# Patient Record
Sex: MALE | Race: WHITE | NOT HISPANIC OR LATINO | Employment: FULL TIME | URBAN - METROPOLITAN AREA
[De-identification: names, ages, dates, MRNs, and addresses within clinical notes are randomized per-mention and may not be internally consistent; named-entity substitution may affect disease eponyms.]

---

## 2017-09-24 ENCOUNTER — APPOINTMENT (OUTPATIENT)
Dept: GENERAL RADIOLOGY | Facility: CLINIC | Age: 27
End: 2017-09-24
Attending: EMERGENCY MEDICINE
Payer: COMMERCIAL

## 2017-09-24 ENCOUNTER — HOSPITAL ENCOUNTER (EMERGENCY)
Facility: CLINIC | Age: 27
Discharge: HOME | End: 2017-09-24
Attending: EMERGENCY MEDICINE | Admitting: EMERGENCY MEDICINE
Payer: COMMERCIAL

## 2017-09-24 VITALS
OXYGEN SATURATION: 100 % | TEMPERATURE: 98.4 F | WEIGHT: 160 LBS | RESPIRATION RATE: 18 BRPM | DIASTOLIC BLOOD PRESSURE: 93 MMHG | SYSTOLIC BLOOD PRESSURE: 144 MMHG | BODY MASS INDEX: 25.82 KG/M2
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
## 2017-09-24 DIAGNOSIS — M79.642 PAIN OF LEFT HAND: ICD-10-CM
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
## 2017-09-24 DIAGNOSIS — S60.012A CONTUSION OF LEFT THUMB WITHOUT DAMAGE TO NAIL, INITIAL ENCOUNTER: ICD-10-CM

## 2017-09-24 PROCEDURE — 99283 EMERGENCY DEPT VISIT LOW MDM: CPT | Performed by: EMERGENCY MEDICINE

## 2017-09-24 PROCEDURE — 73130 X-RAY EXAM OF HAND: CPT | Mod: LT

## 2017-09-24 PROCEDURE — 99283 EMERGENCY DEPT VISIT LOW MDM: CPT | Mod: Z6 | Performed by: EMERGENCY MEDICINE

## 2017-09-24 ASSESSMENT — ENCOUNTER SYMPTOMS
ENDOCRINE NEGATIVE: 1
PSYCHIATRIC NEGATIVE: 1
HEMATOLOGIC/LYMPHATIC NEGATIVE: 1
RESPIRATORY NEGATIVE: 1
CONSTITUTIONAL NEGATIVE: 1
ALLERGIC/IMMUNOLOGIC NEGATIVE: 1
NEUROLOGICAL NEGATIVE: 1
EYES NEGATIVE: 1

## 2017-09-24 NOTE — ED AVS SNAPSHOT
[facility] Emergency Department    [address] [facility] [place] [address]    Phone:  [phone]    Fax:  [phone]                                       Rudi Alejo   MRN: [id number]    Department:  [facility] Emergency Department   Date of Visit:  9/24/2017           After Visit Summary Signature Page     I have received my discharge instructions, and my questions have been answered. I have discussed any challenges I see with this plan with the nurse or doctor.    ..........................................................................................................................................  Patient/Patient Representative Signature      ..........................................................................................................................................  Patient Representative Print Name and Relationship to Patient    ..................................................               ................................................  Date                                            Time    ..........................................................................................................................................  Reviewed by Signature/Title    ...................................................              ..............................................  Date                                                            Time

## 2017-09-24 NOTE — LETTER
To Whom it may concern:      Rudi Alejo was seen in our Emergency Department today, 09/24/17.  I expect his condition to improve over the next 2-3days.  he may return to work/school when improved.        Bao Moreau MD, FACEP
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
## 2017-09-25 ENCOUNTER — OFFICE VISIT (OUTPATIENT)
Dept: FAMILY MEDICINE | Facility: CLINIC | Age: 27
End: 2017-09-25
Payer: COMMERCIAL

## 2017-09-25 VITALS
HEART RATE: 72 BPM | HEIGHT: 66 IN | WEIGHT: 167 LBS | DIASTOLIC BLOOD PRESSURE: 82 MMHG | BODY MASS INDEX: 26.84 KG/M2 | SYSTOLIC BLOOD PRESSURE: 128 MMHG | TEMPERATURE: 98.3 F | RESPIRATION RATE: 16 BRPM

## 2017-09-25 DIAGNOSIS — F41.9 ANXIETY: ICD-10-CM

## 2017-09-25 PROCEDURE — 99213 OFFICE O/P EST LOW 20 MIN: CPT | Performed by: FAMILY MEDICINE

## 2017-09-25 RX ORDER — CITALOPRAM HYDROBROMIDE 20 MG/1
20 TABLET ORAL DAILY
Qty: 90 TABLET | Refills: 3 | Status: SHIPPED | OUTPATIENT
Start: 2017-09-25 | End: 2018-10-10

## 2017-09-25 NOTE — MR AVS SNAPSHOT
"              After Visit Summary   2017    Rudi Alejo    MRN: [id number]           Patient Information     Date Of Birth          1990        Visit Information        Provider Department      2017 12:00 PM Cedric Fields MD [facility]        Today's Diagnoses     Anxiety           Follow-ups after your visit        Who to contact     If you have questions or need follow up information about today's clinic visit or your schedule please contact [facility] directly at [phone].  Normal or non-critical lab and imaging results will be communicated to you by MyChart, letter or phone within 4 business days after the clinic has received the results. If you do not hear from us within 7 days, please contact the clinic through HomeStayhart or phone. If you have a critical or abnormal lab result, we will notify you by phone as soon as possible.  Submit refill requests through [employer] or call your pharmacy and they will forward the refill request to us. Please allow 3 business days for your refill to be completed.          Additional Information About Your Visit        MyChart Information     [employer] lets you send messages to your doctor, view your test results, renew your prescriptions, schedule appointments and more. To sign up, go to [email or web address] . Click on \"Log in\" on the left side of the screen, which will take you to the Welcome page. Then click on \"Sign up Now\" on the right side of the page.     You will be asked to enter the access code listed below, as well as some personal information. Please follow the directions to create your username and password.     Your access code is: [email or web address]  Expires: 2017  9:35 PM     Your access code will  in 90 days. If you need help or a new code, please call your [facility] or [phone].        Care EveryWhere ID     This is your Care EveryWhere ID. This could be used by other organizations to " "access your [place] medical records  [id number]        Your Vitals Were     Pulse Temperature Respirations Height BMI (Body Mass Index)       72 98.3  F (36.8  C) (Tympanic) 16 5' 6\" (1.676 m) 26.95 kg/m2        Blood Pressure from Last 3 Encounters:   09/25/17 128/82   09/24/17 (!) 144/93   09/28/16 104/78    Weight from Last 3 Encounters:   09/25/17 167 lb (75.8 kg)   09/24/17 160 lb (72.6 kg)   09/28/16 165 lb (74.8 kg)              Today, you had the following     No orders found for display         Where to get your medicines      These medications were sent to [facility] [place], [place] - [address]   [address] [facility] [address]     Phone:  [phone]     citalopram 20 MG tablet          Primary Care Provider Office Phone # Fax #    Cedric Fields [phone]       [address] [place] [phone]        Equal Access to Services     [facility] : Hadii estee ambrose hadasho Sorosario, waaxda luqadaha, qaybta kaalmada dot, theresa peña . So [facility] [phone].    ATENCIÓN: Si habla español, tiene a gimenez disposición servicios gratuitos de asistencia lingüística. [facility] [phone].    We comply with applicable federal civil rights laws and [place] laws. We do not discriminate on the basis of race, color, national origin, age, disability sex, sexual orientation or gender identity.            Thank you!     Thank you for choosing [facility]  for your care. Our goal is always to provide you with excellent care. Hearing back from our patients is one way we can continue to improve our services. Please take a few minutes to complete the written survey that you may receive in the mail after your visit with us. Thank you!             Your Updated Medication List - Protect others around you: Learn how to safely use, store and throw away your medicines at www.disposemymeds.org.          This list is accurate as of: " 9/25/17 12:46 PM.  Always use your most recent med list.                   Brand Name Dispense Instructions for use Diagnosis    citalopram 20 MG tablet    celeXA    90 tablet    Take 1 tablet (20 mg) by mouth daily    Anxiety

## 2017-09-25 NOTE — NURSING NOTE
"Chief Complaint   Patient presents with     Anxiety     refills       Initial /82  Pulse 72  Temp 98.3  F (36.8  C) (Tympanic)  Resp 16  Ht 5' 6\" (1.676 m)  Wt 167 lb (75.8 kg)  BMI 26.95 kg/m2 Estimated body mass index is 26.95 kg/(m^2) as calculated from the following:    Height as of this encounter: 5' 6\" (1.676 m).    Weight as of this encounter: 167 lb (75.8 kg).  Medication Reconciliation: complete    Health Maintenance that is potentially due pending provider review:  NONE    n/a    Is there anyone who you would like to be able to receive your results? No  If yes have patient fill out KEEGAN    "
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
## 2018-10-10 ENCOUNTER — OFFICE VISIT (OUTPATIENT)
Dept: FAMILY MEDICINE | Facility: CLINIC | Age: 28
End: 2018-10-10
Payer: COMMERCIAL

## 2018-10-10 VITALS
RESPIRATION RATE: 16 BRPM | WEIGHT: 164 LBS | DIASTOLIC BLOOD PRESSURE: 70 MMHG | HEIGHT: 66 IN | HEART RATE: 70 BPM | SYSTOLIC BLOOD PRESSURE: 114 MMHG | TEMPERATURE: 97.8 F | BODY MASS INDEX: 26.36 KG/M2

## 2018-10-10 DIAGNOSIS — F41.9 ANXIETY: ICD-10-CM

## 2018-10-10 PROCEDURE — 99213 OFFICE O/P EST LOW 20 MIN: CPT | Performed by: FAMILY MEDICINE

## 2018-10-10 RX ORDER — CITALOPRAM HYDROBROMIDE 20 MG/1
20 TABLET ORAL DAILY
Qty: 90 TABLET | Refills: 3 | Status: SHIPPED | OUTPATIENT
Start: 2018-10-10 | End: 2019-11-11

## 2018-10-10 ASSESSMENT — PATIENT HEALTH QUESTIONNAIRE - PHQ9
10. IF YOU CHECKED OFF ANY PROBLEMS, HOW DIFFICULT HAVE THESE PROBLEMS MADE IT FOR YOU TO DO YOUR WORK, TAKE CARE OF THINGS AT HOME, OR GET ALONG WITH OTHER PEOPLE: NOT DIFFICULT AT ALL
SUM OF ALL RESPONSES TO PHQ QUESTIONS 1-9: 2
SUM OF ALL RESPONSES TO PHQ QUESTIONS 1-9: 2

## 2018-10-10 ASSESSMENT — ANXIETY QUESTIONNAIRES
6. BECOMING EASILY ANNOYED OR IRRITABLE: NOT AT ALL
7. FEELING AFRAID AS IF SOMETHING AWFUL MIGHT HAPPEN: NOT AT ALL
3. WORRYING TOO MUCH ABOUT DIFFERENT THINGS: NOT AT ALL
7. FEELING AFRAID AS IF SOMETHING AWFUL MIGHT HAPPEN: NOT AT ALL
4. TROUBLE RELAXING: NOT AT ALL
5. BEING SO RESTLESS THAT IT IS HARD TO SIT STILL: NOT AT ALL
GAD7 TOTAL SCORE: 0
GAD7 TOTAL SCORE: 0
2. NOT BEING ABLE TO STOP OR CONTROL WORRYING: NOT AT ALL
GAD7 TOTAL SCORE: 0
1. FEELING NERVOUS, ANXIOUS, OR ON EDGE: NOT AT ALL

## 2018-10-10 ASSESSMENT — PAIN SCALES - GENERAL: PAINLEVEL: NO PAIN (0)

## 2018-10-10 NOTE — NURSING NOTE
"Chief Complaint   Patient presents with     Anxiety       Initial /70  Pulse 70  Temp 97.8  F (36.6  C) (Tympanic)  Resp 16  Ht 5' 6\" (1.676 m)  Wt 164 lb (74.4 kg)  BMI 26.47 kg/m2 Estimated body mass index is 26.47 kg/(m^2) as calculated from the following:    Height as of this encounter: 5' 6\" (1.676 m).    Weight as of this encounter: 164 lb (74.4 kg).    Patient presents to the clinic using     Health Maintenance that is potentially due pending provider review:          Is there anyone who you would like to be able to receive your results?   If yes have patient fill out KEEGAN    "

## 2018-10-10 NOTE — MR AVS SNAPSHOT
"              After Visit Summary   10/10/2018    Rudi Alejo    MRN: [id number]           Patient Information     Date Of Birth          1990        Visit Information        Provider Department      10/10/2018 11:00 AM Cedric Fields MD [facility]        Today's Diagnoses     Anxiety           Follow-ups after your visit        Who to contact     If you have questions or need follow up information about today's clinic visit or your schedule please contact [facility] directly at [phone].  Normal or non-critical lab and imaging results will be communicated to you by MyChart, letter or phone within 4 business days after the clinic has received the results. If you do not hear from us within 7 days, please contact the clinic through MyChart or phone. If you have a critical or abnormal lab result, we will notify you by phone as soon as possible.  Submit refill requests through Amimon or call your pharmacy and they will forward the refill request to us. Please allow 3 business days for your refill to be completed.          Additional Information About Your Visit        Care EveryWhere ID     This is your Care EveryWhere ID. This could be used by other organizations to access your [facility] medical records  [id number]        Your Vitals Were     Pulse Temperature Respirations Height BMI (Body Mass Index)       70 97.8  F (36.6  C) (Tympanic) 16 5' 6\" (1.676 m) 26.47 kg/m2        Blood Pressure from Last 3 Encounters:   10/10/18 114/70   09/25/17 128/82   09/24/17 (!) 144/93    Weight from Last 3 Encounters:   10/10/18 164 lb (74.4 kg)   09/25/17 167 lb (75.8 kg)   09/24/17 160 lb (72.6 kg)              Today, you had the following     No orders found for display         Where to get your medicines      These medications were sent to [facility] - [place], [place] - [address]  [address] [facility] [address]     Phone:  [phone]     " citalopram 20 MG tablet          Primary Care Provider Office Phone # Fax #    Cedric Fields [phone] [address] [place] [phone]        Equal Access to Services     MIK CR : Hadhemanth estee ku hadtayloro Soomaali, waaxda luqadaha, qaybta kaalmada adeegyada, waxjessee hoangn darin contreras laDeyvijudit garcia. So [facility] [phone].    ATENCIÓN: Si habla español, tiene a gimenez disposición servicios gratuitos de asistencia lingüística. Llame al [phone].    We comply with applicable federal civil rights laws and [place] laws. We do not discriminate on the basis of race, color, national origin, age, disability, sex, sexual orientation, or gender identity.            Thank you!     Thank you for choosing [facility]  for your care. Our goal is always to provide you with excellent care. Hearing back from our patients is one way we can continue to improve our services. Please take a few minutes to complete the written survey that you may receive in the mail after your visit with us. Thank you!             Your Updated Medication List - Protect others around you: Learn how to safely use, store and throw away your medicines at www.disposemymeds.org.          This list is accurate as of 10/10/18 11:45 AM.  Always use your most recent med list.                   Brand Name Dispense Instructions for use Diagnosis    citalopram 20 MG tablet    celeXA    90 tablet    Take 1 tablet (20 mg) by mouth daily    Anxiety

## 2018-10-10 NOTE — PROGRESS NOTES
"  SUBJECTIVE:   Rudi Alejo is a 27 year old male who presents to clinic today for the following health issues:      Anxiety Follow-Up    Status since last visit: No change    Other associated symptoms:None    Complicating factors:   Significant life event: No   Current substance abuse: None  Depression symptoms: No  MITRA-7 SCORE 11/6/2013 9/21/2015 10/10/2018   Total Score 0 - -   Total Score - - 0 (minimal anxiety)   Total Score - 0 0     Answers for HPI/ROS submitted by the patient on 10/10/2018   If you checked off any problems, how difficult have these problems made it for you to do your work, take care of things at home, or get along with other people?: Not difficult at all  PHQ9 TOTAL SCORE: 2  MITRA 7 TOTAL SCORE: 0    MITRA-7    Amount of exercise or physical activity: Job acitve    Problems taking medications regularly: No    Medication side effects: none          S :Rudi Alejo is a 27 year old male with anxiety.  Stable on long term celexa.  Thinking of a job change, so definitely should stay on through that.  He has wanted long term use anyway, so should be fine    No significant side effects, tolerates well    Problem list, med list, additional histories reviewed and updated, as indicated.      O:/70  Pulse 70  Temp 97.8  F (36.6  C) (Tympanic)  Resp 16  Ht 5' 6\" (1.676 m)  Wt 164 lb (74.4 kg)  BMI 26.47 kg/m2  GEN: Alert and oriented, in no acute distress  CV: RRR, no murmur  RESP: lungs clear bilaterally, good effort      A: anxiety, stable on celexa    P: fills for another year.   Doing well.       "

## 2018-10-11 ASSESSMENT — ANXIETY QUESTIONNAIRES: GAD7 TOTAL SCORE: 0

## 2018-10-11 ASSESSMENT — PATIENT HEALTH QUESTIONNAIRE - PHQ9: SUM OF ALL RESPONSES TO PHQ QUESTIONS 1-9: 2

## 2019-11-11 ENCOUNTER — OFFICE VISIT (OUTPATIENT)
Dept: FAMILY MEDICINE | Facility: CLINIC | Age: 29
End: 2019-11-11
Payer: COMMERCIAL

## 2019-11-11 VITALS
HEIGHT: 66 IN | RESPIRATION RATE: 14 BRPM | HEART RATE: 64 BPM | SYSTOLIC BLOOD PRESSURE: 130 MMHG | WEIGHT: 167.2 LBS | DIASTOLIC BLOOD PRESSURE: 80 MMHG | BODY MASS INDEX: 26.87 KG/M2 | TEMPERATURE: 97.8 F

## 2019-11-11 DIAGNOSIS — F41.9 ANXIETY: ICD-10-CM

## 2019-11-11 PROCEDURE — 99213 OFFICE O/P EST LOW 20 MIN: CPT | Performed by: FAMILY MEDICINE

## 2019-11-11 RX ORDER — CITALOPRAM HYDROBROMIDE 20 MG/1
20 TABLET ORAL DAILY
Qty: 90 TABLET | Refills: 3 | Status: SHIPPED | OUTPATIENT
Start: 2019-11-11 | End: 2020-04-28

## 2019-11-11 ASSESSMENT — MIFFLIN-ST. JEOR: SCORE: 1671.16

## 2019-11-11 ASSESSMENT — ANXIETY QUESTIONNAIRES
7. FEELING AFRAID AS IF SOMETHING AWFUL MIGHT HAPPEN: NOT AT ALL
GAD7 TOTAL SCORE: 3
2. NOT BEING ABLE TO STOP OR CONTROL WORRYING: SEVERAL DAYS
5. BEING SO RESTLESS THAT IT IS HARD TO SIT STILL: NOT AT ALL
6. BECOMING EASILY ANNOYED OR IRRITABLE: SEVERAL DAYS
3. WORRYING TOO MUCH ABOUT DIFFERENT THINGS: NOT AT ALL
IF YOU CHECKED OFF ANY PROBLEMS ON THIS QUESTIONNAIRE, HOW DIFFICULT HAVE THESE PROBLEMS MADE IT FOR YOU TO DO YOUR WORK, TAKE CARE OF THINGS AT HOME, OR GET ALONG WITH OTHER PEOPLE: NOT DIFFICULT AT ALL
1. FEELING NERVOUS, ANXIOUS, OR ON EDGE: SEVERAL DAYS

## 2019-11-11 ASSESSMENT — PATIENT HEALTH QUESTIONNAIRE - PHQ9: 5. POOR APPETITE OR OVEREATING: NOT AT ALL

## 2019-11-11 NOTE — PROGRESS NOTES
"Subjective     Rudi Alejo is a 29 year old male who presents to clinic today for the following health issues:    HPI   Anxiety Follow-Up    How are you doing with your anxiety since your last visit? No change    Are you having other symptoms that might be associated with anxiety? No    Have you had a significant life event? Relationship Concerns and Job Concerns     Are you feeling depressed? No    Do you have any concerns with your use of alcohol or other drugs? No    Social History     Tobacco Use     Smoking status: Never Smoker     Smokeless tobacco: Never Used   Substance Use Topics     Alcohol use: No     Drug use: No     MITRA-7 SCORE 9/21/2015 10/10/2018 11/11/2019   Total Score - - -   Total Score - 0 (minimal anxiety) -   Total Score 0 0 3     PHQ 9/21/2015 10/10/2018   PHQ-9 Total Score 2 2   Q9: Thoughts of better off dead/self-harm past 2 weeks Not at all Not at all         How many servings of fruits and vegetables do you eat daily?  0-1    On average, how many sweetened beverages do you drink each day (soda, juice, sweet tea, etc)?   2 tp 3  How many days per week do you miss taking your medication? 1 time a month    What makes it hard for you to take your medications?  remembering to take        S: Rudi Alejo is a 29 year old male with anxiety    Long term celexa.  Works well,  Wants fills    Problem list, med list, additional histories reviewed and updated, as indicated.      No big life changes    O:/80 (BP Location: Right arm, Patient Position: Sitting, Cuff Size: Adult Regular)   Pulse 64   Temp 97.8  F (36.6  C) (Tympanic)   Resp 14   Ht 1.676 m (5' 6\")   Wt 75.8 kg (167 lb 3.2 oz)   BMI 26.99 kg/m    GEN: Alert and oriented, in no acute distress  Well groomed, dressed appropriately. Good eye contact.  No abnormal motor movements, oriented x3, speaks clearly with a normal rate and volume.  Thoughts are coherent and linear.  No tangential responses or loose associatons.  " No obsessive behaviors discussed or observed, no suicidal thoughts.   Responds to questions appropriately, gives detailed answers.   Attention and concentration normal.  Affect WNL.  Insight and judgment appropriate.       A: anxiety, stable on celexa    P: fills.  Declined flu shot.  Self cares.  Back in a year

## 2019-11-11 NOTE — NURSING NOTE
"Chief Complaint   Patient presents with     Anxiety       Initial /80 (BP Location: Right arm, Patient Position: Sitting, Cuff Size: Adult Regular)   Pulse 64   Temp 97.8  F (36.6  C) (Tympanic)   Resp 14   Ht 1.676 m (5' 6\")   Wt 75.8 kg (167 lb 3.2 oz)   BMI 26.99 kg/m   Estimated body mass index is 26.99 kg/m  as calculated from the following:    Height as of this encounter: 1.676 m (5' 6\").    Weight as of this encounter: 75.8 kg (167 lb 3.2 oz).    Patient presents to the clinic using No DME    Health Maintenance that is potentially due pending provider review:  NONE    n/a    Is there anyone who you would like to be able to receive your results? No  If yes have patient fill out KEEGAN    Shanelle Reilly CMA    "

## 2019-11-12 ASSESSMENT — ANXIETY QUESTIONNAIRES: GAD7 TOTAL SCORE: 3

## 2020-04-28 DIAGNOSIS — F41.9 ANXIETY: ICD-10-CM

## 2020-04-28 RX ORDER — CITALOPRAM HYDROBROMIDE 20 MG/1
20 TABLET ORAL DAILY
Qty: 30 TABLET | Refills: 0 | Status: SHIPPED | OUTPATIENT
Start: 2020-04-28 | End: 2020-05-18

## 2020-08-15 DIAGNOSIS — F41.9 ANXIETY: ICD-10-CM

## 2020-08-17 RX ORDER — CITALOPRAM HYDROBROMIDE 20 MG/1
TABLET ORAL
Qty: 90 TABLET | Refills: 0 | Status: SHIPPED | OUTPATIENT
Start: 2020-08-17 | End: 2020-10-21

## 2020-10-21 ENCOUNTER — VIRTUAL VISIT (OUTPATIENT)
Dept: FAMILY MEDICINE | Facility: CLINIC | Age: 30
End: 2020-10-21
Payer: COMMERCIAL

## 2020-10-21 DIAGNOSIS — F41.9 ANXIETY: ICD-10-CM

## 2020-10-21 PROCEDURE — 99207 PR NO CHARGE LOS: CPT | Performed by: FAMILY MEDICINE

## 2020-10-21 RX ORDER — CITALOPRAM HYDROBROMIDE 20 MG/1
20 TABLET ORAL DAILY
Qty: 90 TABLET | Refills: 3 | Status: SHIPPED | OUTPATIENT
Start: 2020-10-21 | End: 2021-11-10

## 2021-11-07 DIAGNOSIS — F41.9 ANXIETY: ICD-10-CM

## 2021-11-10 RX ORDER — CITALOPRAM HYDROBROMIDE 20 MG/1
TABLET ORAL
Qty: 90 TABLET | Refills: 0 | Status: SHIPPED | OUTPATIENT
Start: 2021-11-10 | End: 2022-04-15

## 2022-02-04 DIAGNOSIS — F41.9 ANXIETY: ICD-10-CM

## 2022-02-04 RX ORDER — CITALOPRAM HYDROBROMIDE 20 MG/1
TABLET ORAL
Qty: 90 TABLET | Refills: 0 | OUTPATIENT
Start: 2022-02-04

## 2022-04-15 ENCOUNTER — OFFICE VISIT (OUTPATIENT)
Dept: FAMILY MEDICINE | Facility: CLINIC | Age: 32
End: 2022-04-15
Payer: COMMERCIAL

## 2022-04-15 VITALS
SYSTOLIC BLOOD PRESSURE: 127 MMHG | BODY MASS INDEX: 31.88 KG/M2 | DIASTOLIC BLOOD PRESSURE: 82 MMHG | WEIGHT: 198.4 LBS | RESPIRATION RATE: 18 BRPM | HEART RATE: 77 BPM | HEIGHT: 66 IN | TEMPERATURE: 97.4 F

## 2022-04-15 DIAGNOSIS — R73.09 ELEVATED GLUCOSE: ICD-10-CM

## 2022-04-15 DIAGNOSIS — Z00.00 WELL ADULT EXAM: Primary | ICD-10-CM

## 2022-04-15 DIAGNOSIS — F41.9 ANXIETY: ICD-10-CM

## 2022-04-15 LAB
CHOLEST SERPL-MCNC: 309 MG/DL
FASTING STATUS PATIENT QL REPORTED: YES
FASTING STATUS PATIENT QL REPORTED: YES
GLUCOSE BLD-MCNC: 90 MG/DL (ref 70–99)
HBA1C MFR BLD: 5.4 % (ref 0–5.6)
HDLC SERPL-MCNC: 30 MG/DL
LDLC SERPL CALC-MCNC: 132 MG/DL
LDLC SERPL CALC-MCNC: ABNORMAL MG/DL
NONHDLC SERPL-MCNC: 279 MG/DL
TRIGL SERPL-MCNC: 885 MG/DL

## 2022-04-15 PROCEDURE — 99214 OFFICE O/P EST MOD 30 MIN: CPT | Mod: 25 | Performed by: FAMILY MEDICINE

## 2022-04-15 PROCEDURE — 80061 LIPID PANEL: CPT | Performed by: FAMILY MEDICINE

## 2022-04-15 PROCEDURE — 36415 COLL VENOUS BLD VENIPUNCTURE: CPT | Performed by: FAMILY MEDICINE

## 2022-04-15 PROCEDURE — 99395 PREV VISIT EST AGE 18-39: CPT | Performed by: FAMILY MEDICINE

## 2022-04-15 PROCEDURE — 83721 ASSAY OF BLOOD LIPOPROTEIN: CPT | Mod: 59 | Performed by: FAMILY MEDICINE

## 2022-04-15 PROCEDURE — 83036 HEMOGLOBIN GLYCOSYLATED A1C: CPT | Performed by: FAMILY MEDICINE

## 2022-04-15 PROCEDURE — 82947 ASSAY GLUCOSE BLOOD QUANT: CPT | Performed by: FAMILY MEDICINE

## 2022-04-15 RX ORDER — CITALOPRAM HYDROBROMIDE 20 MG/1
20 TABLET ORAL DAILY
Qty: 90 TABLET | Refills: 3 | Status: SHIPPED | OUTPATIENT
Start: 2022-04-15 | End: 2023-03-28

## 2022-04-15 ASSESSMENT — PAIN SCALES - GENERAL: PAINLEVEL: NO PAIN (0)

## 2022-04-15 ASSESSMENT — ENCOUNTER SYMPTOMS
HEMATOCHEZIA: 0
PARESTHESIAS: 0
FREQUENCY: 0
ARTHRALGIAS: 0
SHORTNESS OF BREATH: 0
FEVER: 0
HEADACHES: 0
SORE THROAT: 0
JOINT SWELLING: 0
HEMATURIA: 0
EYE PAIN: 0
COUGH: 0
HEARTBURN: 0
ABDOMINAL PAIN: 0
NERVOUS/ANXIOUS: 0
DIZZINESS: 0
WEAKNESS: 0
CONSTIPATION: 0
MYALGIAS: 0
CHILLS: 0
DYSURIA: 0
PALPITATIONS: 0
DIARRHEA: 0
NAUSEA: 0

## 2022-04-15 NOTE — LETTER
April 20, 2022      Rudi Alejo  [address]  [place] [address]        Dear ,    We are writing to inform you of your test results.    Test results indicate you may require additional follow up, see comment below.    Resulted Orders   Lipid panel reflex to direct LDL Fasting   Result Value Ref Range    Cholesterol 309 (H) <200 mg/dL    Triglycerides 885 (H) <150 mg/dL    Direct Measure HDL 30 (L) >=40 mg/dL    LDL Cholesterol Calculated        Comment:      Cannot estimate LDL when triglyceride exceeds 400 mg/dL    Non HDL Cholesterol 279 (H) <130 mg/dL    Patient Fasting > 8hrs? Yes     Narrative    Cholesterol  Desirable:  <200 mg/dL    Triglycerides  Normal:  Less than 150 mg/dL  Borderline High:  150-199 mg/dL  High:  200-499 mg/dL  Very High:  Greater than or equal to 500 mg/dL    Direct Measure HDL  Female:  Greater than or equal to 50 mg/dL   Male:  Greater than or equal to 40 mg/dL    LDL Cholesterol  Desirable:  <100mg/dL  Above Desirable:  100-129 mg/dL   Borderline High:  130-159 mg/dL   High:  160-189 mg/dL   Very High:  >= 190 mg/dL    Non HDL Cholesterol  Desirable:  130 mg/dL  Above Desirable:  130-159 mg/dL  Borderline High:  160-189 mg/dL  High:  190-219 mg/dL  Very High:  Greater than or equal to 220 mg/dL   Hemoglobin A1c   Result Value Ref Range    Hemoglobin A1C 5.4 0.0 - 5.6 %      Comment:      Normal <5.7%   Prediabetes 5.7-6.4%    Diabetes 6.5% or higher     Note: Adopted from ADA consensus guidelines.   Glucose   Result Value Ref Range    Glucose 90 70 - 99 mg/dL    Patient Fasting > 8hrs? Yes    LDL cholesterol direct   Result Value Ref Range    LDL Cholesterol Direct 132 (H) <100 mg/dL      Comment:      Age 0-19 years:  Desirable: 0-110 mg/dL   Borderline high: 110-129 mg/dL   High: >= 130 mg/dL    Age 20 years and older:  Desirable: <100mg/dL  Above desirable: 100-129 mg/dL   Borderline high: 130-159 mg/dL   High: 160-189 mg/dL   Very high: >= 190 mg/dL  "  Triglycerides  are way up.  This is most affected by all the fast food and weight gain.  If it stays up this high, you may need to get on medication for it.       Triglyceride numbers are generally improved by cutting back on greasy/creamy/fatty/fried foods, eating more veggies/salads/fruits, and getting regular exercise. Switching to olive oil as your main cooking and flavoring oil is also a good idea.  (it's considered a \"good\" fat).   Cutting out sugars as much as possible is also very important to improve things.     We should recheck in 6-12 months.           If you have any questions or concerns, please call the clinic at the number listed above.       Sincerely,      Cedric Fields MD/lakhwinder           "

## 2022-04-15 NOTE — PROGRESS NOTES
SUBJECTIVE:   CC: Rudi Alejo is an 31 year old male who presents for preventative health visit.       Patient has been advised of split billing requirements and indicates understanding: Yes  Healthy Habits:     Getting at least 3 servings of Calcium per day:  Yes    Bi-annual eye exam:  NO    Dental care twice a year:  NO    Sleep apnea or symptoms of sleep apnea:  None    Diet:  Regular (no restrictions)    Frequency of exercise:  None    Taking medications regularly:  Yes    Medication side effects:  None    PHQ-2 Total Score: 1    Additional concerns today:  No          Anxiety: worse off celexa.  Would like to get back on.  Just ran out.  Long term med for him over the years, does better on it.     Now working at [employer], likes it.     Has put on some weight.  Knows he needs to drop some      Today's PHQ-2 Score:   PHQ-2 ( 1999 [employer]) 4/15/2022   Q1: Little interest or pleasure in doing things 1   Q2: Feeling down, depressed or hopeless 0   PHQ-2 Score 1   PHQ-2 Total Score (12-17 Years)- Positive if 3 or more points; Administer PHQ-A if positive -   Q1: Little interest or pleasure in doing things Several days   Q2: Feeling down, depressed or hopeless Not at all   PHQ-2 Score 1       Abuse: Current or Past(Physical, Sexual or Emotional)- No  Do you feel safe in your environment? Yes    Have you ever done Advance Care Planning? (For example, a Health Directive, POLST, or a discussion with a medical provider or your loved ones about your wishes): No, advance care planning information given to patient to review.  Patient declined advance care planning discussion at this time.    Social History     Tobacco Use     Smoking status: Never Smoker     Smokeless tobacco: Never Used   Substance Use Topics     Alcohol use: No     If you drink alcohol do you typically have >3 drinks per day or >7 drinks per week? No    Alcohol Use 4/15/2022   Prescreen: >3 drinks/day or >7 drinks/week? Not Applicable   Prescreen:  ">3 drinks/day or >7 drinks/week? -   No flowsheet data found.    Last PSA: No results found for: PSA    Reviewed orders with patient. Reviewed health maintenance and updated orders accordingly - Yes  Reviewed and updated as needed this visit by clinical staff   Tobacco  Allergies  Meds   Med Hx  Surg Hx  Fam Hx  Soc Hx          Reviewed and updated as needed this visit by Provider                       Review of Systems   Constitutional: Negative for chills and fever.   HENT: Negative for congestion, ear pain, hearing loss and sore throat.    Eyes: Negative for pain and visual disturbance.   Respiratory: Negative for cough and shortness of breath.    Cardiovascular: Negative for chest pain, palpitations and peripheral edema.   Gastrointestinal: Negative for abdominal pain, constipation, diarrhea, heartburn, hematochezia and nausea.   Genitourinary: Negative for dysuria, frequency, genital sores, hematuria, impotence, penile discharge and urgency.   Musculoskeletal: Negative for arthralgias, joint swelling and myalgias.   Skin: Negative for rash.   Neurological: Negative for dizziness, weakness, headaches and paresthesias.   Psychiatric/Behavioral: Negative for mood changes. The patient is not nervous/anxious.          OBJECTIVE:   /82   Pulse 77   Temp 97.4  F (36.3  C)   Resp 18   Ht 1.683 m (5' 6.25\")   Wt 90 kg (198 lb 6.4 oz)   BMI 31.78 kg/m      Physical Exam  Gen: alert and oriented, in no acute distress, affect within normal limits  Neck: supple with no masses or nodes  Throat: oropharynx clear, no exudate or tonsillar/palate asymmetry.    CV: RRR, no murmur  Lungs: clear bilaterally with good effort  Abd: nontender, no mass  Ext: no edema or lesions   Neuro: moving all extremities, gait normal, no focal deficts noted      ASSESSMENT/PLAN:   Well exam  Anxiety, worse off meds    Back on celexa.  Labs for work form.  Work on wt loss.             COUNSELING:   Reviewed preventive health " "counseling, as reflected in patient instructions       Regular exercise       Healthy diet/nutrition    Estimated body mass index is 31.78 kg/m  as calculated from the following:    Height as of this encounter: 1.683 m (5' 6.25\").    Weight as of this encounter: 90 kg (198 lb 6.4 oz).         He reports that he has never smoked. He has never used smokeless tobacco.        Cedric Fields MD  [facility]  "

## 2023-03-28 DIAGNOSIS — F41.9 ANXIETY: ICD-10-CM

## 2023-03-28 RX ORDER — CITALOPRAM HYDROBROMIDE 20 MG/1
TABLET ORAL
Qty: 90 TABLET | Refills: 0 | Status: SHIPPED | OUTPATIENT
Start: 2023-03-28 | End: 2024-03-05

## 2023-04-20 ENCOUNTER — PATIENT OUTREACH (OUTPATIENT)
Dept: CARE COORDINATION | Facility: CLINIC | Age: 33
End: 2023-04-20
Payer: COMMERCIAL

## 2023-06-30 DIAGNOSIS — F41.9 ANXIETY: ICD-10-CM

## 2023-07-03 RX ORDER — CITALOPRAM HYDROBROMIDE 20 MG/1
TABLET ORAL
Qty: 90 TABLET | Refills: 0 | OUTPATIENT
Start: 2023-07-03

## 2023-07-03 NOTE — TELEPHONE ENCOUNTER
7/31/23 Appt Scheduled Dr. Fields.  Pt does not need refill until then.  [facility]

## 2024-03-04 ENCOUNTER — TELEPHONE (OUTPATIENT)
Dept: FAMILY MEDICINE | Facility: CLINIC | Age: 34
End: 2024-03-04
Payer: COMMERCIAL

## 2024-03-04 NOTE — TELEPHONE ENCOUNTER
Spoke with Rudi, he is at work, feeling better today but still feels jittery. He says he has had anxiety for a long time and was taking Celexa but has been off the Celexa for a long time, never refilled it or came back in. He feels safe today and I did make him an appointment for tomorrow morning. He will call back if he gets worse or has any other concerns todfay. He is aware we have urgent Care here today too if he feels he needs to be seen today

## 2024-03-04 NOTE — TELEPHONE ENCOUNTER
Symptoms    Describe your symptoms: Anxiety - Pt had a Panic Attack last night. Pt said and woke up feeling very anxious.  Pt requesting to talk to a Nurse.   Did talk to RN Jennifer Hair and she will call pt back    Preferred Pharmacy:       [facility]      Okay to leave a detailed message?: Yes at Home number on file [phone] (home)  [facility]

## 2024-03-05 ENCOUNTER — OFFICE VISIT (OUTPATIENT)
Dept: FAMILY MEDICINE | Facility: CLINIC | Age: 34
End: 2024-03-05
Payer: COMMERCIAL

## 2024-03-05 VITALS
TEMPERATURE: 97.6 F | OXYGEN SATURATION: 98 % | DIASTOLIC BLOOD PRESSURE: 74 MMHG | BODY MASS INDEX: 31.5 KG/M2 | RESPIRATION RATE: 16 BRPM | WEIGHT: 196 LBS | HEART RATE: 86 BPM | HEIGHT: 66 IN | SYSTOLIC BLOOD PRESSURE: 116 MMHG

## 2024-03-05 DIAGNOSIS — E55.9 MILD VITAMIN D DEFICIENCY: ICD-10-CM

## 2024-03-05 DIAGNOSIS — F41.0 PANIC ATTACK: Primary | ICD-10-CM

## 2024-03-05 DIAGNOSIS — E78.1 HYPERTRIGLYCERIDEMIA: ICD-10-CM

## 2024-03-05 LAB
CHOLEST SERPL-MCNC: 219 MG/DL
ERYTHROCYTE [DISTWIDTH] IN BLOOD BY AUTOMATED COUNT: 12.5 % (ref 10–15)
FASTING STATUS PATIENT QL REPORTED: YES
FERRITIN SERPL-MCNC: 177 NG/ML (ref 31–409)
HCT VFR BLD AUTO: 44.8 % (ref 40–53)
HDLC SERPL-MCNC: 26 MG/DL
HGB BLD-MCNC: 15.1 G/DL (ref 13.3–17.7)
LDLC SERPL CALC-MCNC: ABNORMAL MG/DL
MCH RBC QN AUTO: 28.8 PG (ref 26.5–33)
MCHC RBC AUTO-ENTMCNC: 33.7 G/DL (ref 31.5–36.5)
MCV RBC AUTO: 86 FL (ref 78–100)
NONHDLC SERPL-MCNC: 193 MG/DL
PLATELET # BLD AUTO: 266 10E3/UL (ref 150–450)
RBC # BLD AUTO: 5.24 10E6/UL (ref 4.4–5.9)
TRIGL SERPL-MCNC: 439 MG/DL
TSH SERPL DL<=0.005 MIU/L-ACNC: 2.32 UIU/ML (ref 0.3–4.2)
WBC # BLD AUTO: 7.4 10E3/UL (ref 4–11)

## 2024-03-05 PROCEDURE — 36415 COLL VENOUS BLD VENIPUNCTURE: CPT | Performed by: FAMILY MEDICINE

## 2024-03-05 PROCEDURE — 80061 LIPID PANEL: CPT | Performed by: FAMILY MEDICINE

## 2024-03-05 PROCEDURE — 82728 ASSAY OF FERRITIN: CPT | Performed by: FAMILY MEDICINE

## 2024-03-05 PROCEDURE — 99213 OFFICE O/P EST LOW 20 MIN: CPT | Performed by: FAMILY MEDICINE

## 2024-03-05 PROCEDURE — 84443 ASSAY THYROID STIM HORMONE: CPT | Performed by: FAMILY MEDICINE

## 2024-03-05 PROCEDURE — 85027 COMPLETE CBC AUTOMATED: CPT | Performed by: FAMILY MEDICINE

## 2024-03-05 PROCEDURE — 82306 VITAMIN D 25 HYDROXY: CPT | Performed by: FAMILY MEDICINE

## 2024-03-05 PROCEDURE — 83721 ASSAY OF BLOOD LIPOPROTEIN: CPT | Mod: 59 | Performed by: FAMILY MEDICINE

## 2024-03-05 RX ORDER — HYDROXYZINE PAMOATE 25 MG/1
25-50 CAPSULE ORAL 3 TIMES DAILY PRN
Qty: 30 CAPSULE | Refills: 2 | Status: SHIPPED | OUTPATIENT
Start: 2024-03-05

## 2024-03-05 ASSESSMENT — ANXIETY QUESTIONNAIRES
7. FEELING AFRAID AS IF SOMETHING AWFUL MIGHT HAPPEN: SEVERAL DAYS
6. BECOMING EASILY ANNOYED OR IRRITABLE: SEVERAL DAYS
IF YOU CHECKED OFF ANY PROBLEMS ON THIS QUESTIONNAIRE, HOW DIFFICULT HAVE THESE PROBLEMS MADE IT FOR YOU TO DO YOUR WORK, TAKE CARE OF THINGS AT HOME, OR GET ALONG WITH OTHER PEOPLE: SOMEWHAT DIFFICULT
GAD7 TOTAL SCORE: 7
5. BEING SO RESTLESS THAT IT IS HARD TO SIT STILL: SEVERAL DAYS
3. WORRYING TOO MUCH ABOUT DIFFERENT THINGS: SEVERAL DAYS
8. IF YOU CHECKED OFF ANY PROBLEMS, HOW DIFFICULT HAVE THESE MADE IT FOR YOU TO DO YOUR WORK, TAKE CARE OF THINGS AT HOME, OR GET ALONG WITH OTHER PEOPLE?: SOMEWHAT DIFFICULT
GAD7 TOTAL SCORE: 7
2. NOT BEING ABLE TO STOP OR CONTROL WORRYING: SEVERAL DAYS
4. TROUBLE RELAXING: SEVERAL DAYS
7. FEELING AFRAID AS IF SOMETHING AWFUL MIGHT HAPPEN: SEVERAL DAYS
GAD7 TOTAL SCORE: 7
1. FEELING NERVOUS, ANXIOUS, OR ON EDGE: SEVERAL DAYS

## 2024-03-05 ASSESSMENT — PAIN SCALES - GENERAL: PAINLEVEL: NO PAIN (0)

## 2024-03-05 ASSESSMENT — PATIENT HEALTH QUESTIONNAIRE - PHQ9
SUM OF ALL RESPONSES TO PHQ QUESTIONS 1-9: 10
SUM OF ALL RESPONSES TO PHQ QUESTIONS 1-9: 10
10. IF YOU CHECKED OFF ANY PROBLEMS, HOW DIFFICULT HAVE THESE PROBLEMS MADE IT FOR YOU TO DO YOUR WORK, TAKE CARE OF THINGS AT HOME, OR GET ALONG WITH OTHER PEOPLE: SOMEWHAT DIFFICULT

## 2024-03-05 ASSESSMENT — ENCOUNTER SYMPTOMS: NERVOUS/ANXIOUS: 1

## 2024-03-05 NOTE — NURSING NOTE
"Chief Complaint   Patient presents with    Anxiety     Stopped Citalopram about 1 year ago       Initial /74   Pulse 86   Temp 97.6  F (36.4  C) (Tympanic)   Resp 16   Ht 1.683 m (5' 6.25\")   Wt 88.9 kg (196 lb)   SpO2 98%   BMI 31.40 kg/m   Estimated body mass index is 31.4 kg/m  as calculated from the following:    Height as of this encounter: 1.683 m (5' 6.25\").    Weight as of this encounter: 88.9 kg (196 lb).    Patient presents to the clinic using No DME    Is there anyone who you would like to be able to receive your results? No  If yes have patient fill out KEEGAN      "

## 2024-03-05 NOTE — PROGRESS NOTES
"  Assessment & Plan     Panic attack  Discussed therapy, preventative medication, as needed medication. Will start with as needed medication first. Decided to defer therapy today. Labs as below follow up in 4 weeks.  - TSH with free T4 reflex; Future  - Vitamin D Deficiency; Future  - CBC with platelets; Future  - Ferritin; Future  - hydrOXYzine hudson (VISTARIL) 25 MG capsule; Take 1-2 capsules (25-50 mg) by mouth 3 times daily as needed for anxiety (insomnia)  - PRIMARY CARE FOLLOW-UP SCHEDULING; Future    Hypertriglyceridemia  triglyceride >500 4/2022  Discussed diet and exercise goal  - Lipid panel reflex to direct LDL Fasting; Future      20 minutes spent by me on the date of the encounter doing chart review, history and exam, documentation and further activities per the note      BMI  Estimated body mass index is 31.4 kg/m  as calculated from the following:    Height as of this encounter: 1.683 m (5' 6.25\").    Weight as of this encounter: 88.9 kg (196 lb).       Depression Screening Follow Up        3/5/2024     7:46 AM   PHQ   PHQ-9 Total Score 10   Q9: Thoughts of better off dead/self-harm past 2 weeks Not at all         Follow Up Actions Taken  Follow up recommended: 4 weeks            Rowdy Grijalva is a 33 year old, presenting for the following health issues:  Anxiety (Stopped Citalopram about 1 year ago)    History of Present Illness       Mental Health Follow-up:  Patient presents to follow-up on Depression & Anxiety.Patient's depression since last visit has been:  Medium  The patient is not having other symptoms associated with depression.  Patient's anxiety since last visit has been:  Bad  The patient is having other symptoms associated with anxiety.  Any significant life events: relationship concerns, job concerns, financial concerns and health concerns  Patient is feeling anxious or having panic attacks.  Patient has no concerns about alcohol or drug use.    He eats 0-1 servings of fruits and " "vegetables daily.He consumes 3 sweetened beverage(s) daily.He exercises with enough effort to increase his heart rate 60 or more minutes per day.  He exercises with enough effort to increase his heart rate 5 days per week. He is missing 7 dose(s) of medications per week.    Panic attack the night before  Has had it before but usually comes and goes  The morning after felt jittery  Usually tries to wait it out  Celexa took for a long time starting at 19  Multiple acute stressors                    Objective    /74   Pulse 86   Temp 97.6  F (36.4  C) (Tympanic)   Resp 16   Ht 1.683 m (5' 6.25\")   Wt 88.9 kg (196 lb)   SpO2 98%   BMI 31.40 kg/m    Body mass index is 31.4 kg/m .  Physical Exam  Vitals reviewed.   Cardiovascular:      Rate and Rhythm: Normal rate.   Pulmonary:      Effort: Pulmonary effort is normal.   Neurological:      Mental Status: He is alert.                    Signed Electronically by: Ruthann Landaverde MD    Answers submitted by the patient for this visit:  Patient Health Questionnaire (Submitted on 3/5/2024)  If you checked off any problems, how difficult have these problems made it for you to do your work, take care of things at home, or get along with other people?: Somewhat difficult  PHQ9 TOTAL SCORE: 10    "

## 2024-03-06 ENCOUNTER — APPOINTMENT (OUTPATIENT)
Dept: GENERAL RADIOLOGY | Facility: CLINIC | Age: 34
End: 2024-03-06
Attending: EMERGENCY MEDICINE
Payer: OTHER MISCELLANEOUS

## 2024-03-06 ENCOUNTER — HOSPITAL ENCOUNTER (EMERGENCY)
Facility: CLINIC | Age: 34
Discharge: HOME | End: 2024-03-06
Attending: EMERGENCY MEDICINE | Admitting: EMERGENCY MEDICINE
Payer: OTHER MISCELLANEOUS

## 2024-03-06 VITALS
SYSTOLIC BLOOD PRESSURE: 159 MMHG | DIASTOLIC BLOOD PRESSURE: 97 MMHG | TEMPERATURE: 98.2 F | OXYGEN SATURATION: 96 % | HEART RATE: 88 BPM | RESPIRATION RATE: 18 BRPM

## 2024-03-06 DIAGNOSIS — S61.111A LACERATION OF RIGHT THUMB WITHOUT FOREIGN BODY WITH DAMAGE TO NAIL, INITIAL ENCOUNTER: ICD-10-CM

## 2024-03-06 DIAGNOSIS — S62.521B OPEN FRACTURE OF TUFT OF DISTAL PHALANX OF RIGHT THUMB: ICD-10-CM

## 2024-03-06 DIAGNOSIS — S61.101A AVULSION OF NAIL OF RIGHT THUMB: ICD-10-CM

## 2024-03-06 LAB
LDLC SERPL DIRECT ASSAY-MCNC: 121 MG/DL
VIT D+METAB SERPL-MCNC: 19 NG/ML (ref 20–50)

## 2024-03-06 PROCEDURE — 250N000011 HC RX IP 250 OP 636: Performed by: EMERGENCY MEDICINE

## 2024-03-06 PROCEDURE — 12001 RPR S/N/AX/GEN/TRNK 2.5CM/<: CPT | Performed by: EMERGENCY MEDICINE

## 2024-03-06 PROCEDURE — 90471 IMMUNIZATION ADMIN: CPT | Performed by: EMERGENCY MEDICINE

## 2024-03-06 PROCEDURE — 99283 EMERGENCY DEPT VISIT LOW MDM: CPT | Mod: 25 | Performed by: EMERGENCY MEDICINE

## 2024-03-06 PROCEDURE — 90715 TDAP VACCINE 7 YRS/> IM: CPT | Performed by: EMERGENCY MEDICINE

## 2024-03-06 PROCEDURE — 73140 X-RAY EXAM OF FINGER(S): CPT | Mod: RT

## 2024-03-06 RX ORDER — OXYCODONE HYDROCHLORIDE 5 MG/1
5 TABLET ORAL EVERY 6 HOURS PRN
Qty: 10 TABLET | Refills: 0 | Status: SHIPPED | OUTPATIENT
Start: 2024-03-06 | End: 2024-04-18

## 2024-03-06 RX ORDER — CLINDAMYCIN HCL 150 MG
300 CAPSULE ORAL 4 TIMES DAILY
Qty: 30 CAPSULE | Refills: 0 | Status: SHIPPED | OUTPATIENT
Start: 2024-03-06 | End: 2024-04-18

## 2024-03-06 RX ADMIN — CLOSTRIDIUM TETANI TOXOID ANTIGEN (FORMALDEHYDE INACTIVATED), CORYNEBACTERIUM DIPHTHERIAE TOXOID ANTIGEN (FORMALDEHYDE INACTIVATED), BORDETELLA PERTUSSIS TOXOID ANTIGEN (GLUTARALDEHYDE INACTIVATED), BORDETELLA PERTUSSIS FILAMENTOUS HEMAGGLUTININ ANTIGEN (FORMALDEHYDE INACTIVATED), BORDETELLA PERTUSSIS PERTACTIN ANTIGEN, AND BORDETELLA PERTUSSIS FIMBRIAE 2/3 ANTIGEN 0.5 ML: 5; 2; 2.5; 5; 3; 5 INJECTION, SUSPENSION INTRAMUSCULAR at 20:03

## 2024-03-06 ASSESSMENT — ACTIVITIES OF DAILY LIVING (ADL)
ADLS_ACUITY_SCORE: 35

## 2024-03-06 ASSESSMENT — COLUMBIA-SUICIDE SEVERITY RATING SCALE - C-SSRS
2. HAVE YOU ACTUALLY HAD ANY THOUGHTS OF KILLING YOURSELF IN THE PAST MONTH?: NO
1. IN THE PAST MONTH, HAVE YOU WISHED YOU WERE DEAD OR WISHED YOU COULD GO TO SLEEP AND NOT WAKE UP?: NO
6. HAVE YOU EVER DONE ANYTHING, STARTED TO DO ANYTHING, OR PREPARED TO DO ANYTHING TO END YOUR LIFE?: NO

## 2024-03-06 NOTE — LETTER
1990      To Whom it may concern:    Rudi Alejo was seen in our Emergency Department today, 03/06/24 for an injury that was reported to be work related.      The injury occurred on 3/6/2024.  Diagnosis: Right thumb laceration with nail avulsion and open fracture of the distal phalanx.      For the next 2 days he should not work.    After returning the following restrictions apply until 3/22/2024:  A) Bend: No restrictions  B) Squat: No restrictions  C) Walk/Stand: No restrictions  D) Reach above shoulders: No restrictions  E) Lift, carry, push and pull no more than: 0-10 lbs with the right hand.    The employee might be taking medication so that they cannot operate moving machinery or perform activities that require balancing or working above ground.  The employee must keep the injured site clean and dry.    Follow up: with orthopedic surgery.    Sincerely,

## 2024-03-07 ENCOUNTER — TELEPHONE (OUTPATIENT)
Dept: ORTHOPEDICS | Facility: CLINIC | Age: 34
End: 2024-03-07

## 2024-03-07 NOTE — ED PROVIDER NOTES
"  History     Chief Complaint   Patient presents with    Hand Injury     HPI  Rudi Alejo is a 33 year old male who presents for right thumb pain.  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  Immediate onset of pain.  He has not taken thing for the pain.  No other injuries.    Allergies:  Allergies   Allergen Reactions    Suprax [Cefixime] Hives       Problem List:    Patient Active Problem List    Diagnosis Date Noted    Hx of spina bifida 11/06/2013     Priority: Medium     11/5/2014 MRI of entire spine given his history.  Lumbar with interesting findings.  Per the neurosurgeon who saw him in clinic for follow up:      \"His most remarkable MRI was his lumbar spine. 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 He has some malformed posterior elements actually extending into the thoracic region as visible on the thoracic MRI. Essentially, he has a large thecal sac with tiny, little strands extending from his spinal cord. These, however, appear not to be causing any adhesions. In fact, there is good CSF surrounding the spinal cord at all levels. His nerves appear to float freely within the thecal sac. There is no margination along the sac. However, he does have very little soft tissue extending from his dura to the surface of the skin, on the order of 1-2 cm. Because of this, I told him that he should exercise caution in terms of any damage caused by an impact blow along the lower portion of his back\"    No specific follow up recommended per neurosurgery.        CARDIOVASCULAR SCREENING; LDL GOAL LESS THAN 160 02/20/2012     Priority: Medium    Anxiety 07/26/2010     Priority: Medium     Better on celexa       Patellofemoral pain syndrome 04/05/2010     Priority: Medium        Past Medical History:    No past medical history on file.    Past Surgical History:    No past surgical " history on file.    Family History:    Family History   Problem Relation Age of Onset    Diabetes Maternal Grandmother     Neurologic Disorder Maternal Grandmother     Arthritis Maternal Grandfather     C.A.D. Maternal Grandfather     Cancer - colorectal Paternal Grandmother     Cancer - colorectal Paternal Grandfather        Social History:  Marital Status:  Single [1]  Social History     Tobacco Use    Smoking status: Never    Smokeless tobacco: Never   Substance Use Topics    Alcohol use: No    Drug use: No        Medications:    clindamycin (CLEOCIN) 150 MG capsule  oxyCODONE (ROXICODONE) 5 MG tablet  hydrOXYzine hudson (VISTARIL) 25 MG capsule          Review of Systems    Physical Exam   BP: (!) 166/102  Pulse: 85  Temp: 98.2  F (36.8  C)  Resp: 18  SpO2: 97 %      Physical Exam  Constitutional:       General: He is not in acute distress.     Appearance: He is well-developed. He is not diaphoretic.   HENT:      Head: Normocephalic and atraumatic.      Nose: No congestion.      Mouth/Throat:      Mouth: Mucous membranes are moist.   Eyes:      General: No scleral icterus.     Conjunctiva/sclera: Conjunctivae normal.   Cardiovascular:      Rate and Rhythm: Normal rate.   Pulmonary:      Effort: Pulmonary effort is normal.   Abdominal:      General: Abdomen is flat.   Musculoskeletal:      Cervical back: Normal range of motion and neck supple.   Skin:     General: Skin is warm and dry.      Capillary Refill: Capillary refill takes less than 2 seconds.      Findings: No rash.      Comments: Right thumb: Open wound to the distal portion of the thumb, fractured nail. Sensation intact to light touch on the distal portion of the digit.   Neurological:      Mental Status: He is alert and oriented to person, place, and time.         ED [facility]    -Laceration Repair    Date/Time: 3/6/2024 10:37 PM    Performed by: Kike Pina MD  Authorized by: Kike Pina,  MD    Risks, benefits and alternatives discussed.      ANESTHESIA (see MAR for exact dosages):     Anesthesia method:  Nerve block (digital block)    Block needle gauge:  30 G    Block anesthetic:  Bupivacaine 0.5% w/o epi    Block injection procedure:  Anatomic landmarks identified, introduced needle, incremental injection, anatomic landmarks palpated and negative aspiration for blood    Block outcome:  Anesthesia achieved    LACERATION DETAILS     Location:  Finger    Finger location:  R thumb    Length (cm):  1.5    REPAIR TYPE:     Repair type:  Intermediate    EXPLORATION:     Hemostasis achieved with:  Tourniquet    Wound exploration: wound explored through full range of motion and entire depth of wound probed and visualized      Wound extent: underlying fracture      TREATMENT:     Area cleansed with:  Soap and water    SKIN REPAIR     Repair method:  Sutures    Suture size:  4-0    Wound skin closure material used: vicryl rapide.    Suture technique:  Simple interrupted    Number of sutures:  4    APPROXIMATION     Approximation:  Close    POST-PROCEDURE DETAILS     Dressing:  Antibiotic ointment, non-adherent dressing and splint for protection      PROCEDURE  Describe Procedure: Application of sterile dressing into the nailbed matrix  Patient Tolerance:  Patient tolerated the procedure well with no immediate complications                Critical Care time:  none               Results for orders placed or performed during the hospital encounter of 03/06/24 (from the past 24 hour(s))   XR Finger Right G/E 2 Views    Narrative    EXAM: XR FINGER RIGHT G/E 2 VIEWS  LOCATION: [facility]  DATE: 3/6/2024    INDICATION: crushing injury, pain and open wound to distal portion of thumb  COMPARISON: None.      Impression    IMPRESSION: Soft tissue wound of the distal thumb. Subtle cortical lucency involving the tuft of the distal phalanx which may reflect a nondisplaced fracture. No  radiopaque foreign body. Normal joint spaces and alignment.    NOTE: ABNORMAL REPORT    THE DICTATION ABOVE DESCRIBES AN ABNORMALITY FOR WHICH FOLLOW-UP IS NEEDED.          Medications   Tdap (tetanus-diphtheria-acell pertussis) (ADACEL) injection 0.5 mL (0.5 mLs Intramuscular $Given 3/6/24 2003)       Assessments & Plan (with Medical Decision Making)   33-year-old male presents for crushing injury to the right thumb.  Digital block placed for discomfort.  His tetanus is updated.  X-ray of the thumb obtained, images interpreted independently as well as radiology read reviewed, positive for fracture through the distal phalanx.  Laceration was repaired as above and the patient was placed in a splint to protect it.  I will start the patient on antibiotics and he is given a short course of pain medicine for outpatient management.  Referrals placed to orthopedic surgery for close follow-up and observation of the wound and open fracture.  They are told to return if he has worsening of symptoms or other concerns, otherwise follow-up in clinic.  The patient is in agreement with this plan.    I have reviewed the nursing notes.    I have reviewed the findings, diagnosis, plan and need for follow up with the patient.           Discharge Medication List as of 3/6/2024 10:10 PM        START taking these medications    Details   clindamycin (CLEOCIN) 150 MG capsule Take 2 capsules (300 mg) by mouth 4 times daily, Disp-30 capsule, R-0, [facility]      oxyCODONE (ROXICODONE) 5 MG tablet Take 1 tablet (5 mg) by mouth every 6 hours as needed for severe pain, Disp-10 tablet, R-0, [facility]             Final diagnoses:   Laceration of right thumb without foreign body with damage to nail, initial encounter   Avulsion of nail of right thumb   Open fracture of tuft of distal phalanx of right thumb       3/6/2024   [facility] EMERGENCY DEPT       Kike Pina MD  03/06/24 0232

## 2024-03-07 NOTE — DISCHARGE INSTRUCTIONS
Keep the wound clean and dry for the next 24 hours.  Apply Vaseline or topical antibiotic ointment to the tip of the thumb several times per day until the stitches followed.  Protect your thumb with the splint.  Follow-up in orthopedic surgery clinic for a recheck.  The stitches will dissolve on their own over the next 7 to 10 days.  Take the antibiotics as prescribed.

## 2024-03-07 NOTE — ED TRIAGE NOTES
Patient was working with [employer] and smashed right thumb between forks. Bleeding controlled.      Triage Assessment (Adult)       Row Name 03/06/24 1939          Triage Assessment    Airway WDL WDL        Respiratory WDL    Respiratory WDL WDL        Skin Circulation/Temperature WDL    Skin Circulation/Temperature WDL WDL        Cardiac WDL    Cardiac WDL WDL        Peripheral/Neurovascular WDL    Peripheral Neurovascular WDL WDL        Cognitive/Neuro/Behavioral WDL    Cognitive/Neuro/Behavioral WDL WDL

## 2024-03-07 NOTE — ED NOTES
Reports being at work tonight about 18:30 was adjusting the forks to increase width. Reports catching the right thumb between the fork and the mast. There is a transverse laceration across the width of tip of the thumb, and a second laceration down the middle of the right thumb tip.

## 2024-03-07 NOTE — TELEPHONE ENCOUNTER
[facility] Call [facility]    Phone Message    May a detailed message be left on voicemail: yes     Reason for Call: Other: Pt coming in for   Open fracture of tuft of distal phalanx of right thumb. Date of injury : 03-06-24 was seen in ER at the [facility] location on 03/06/24. See ER office encounter note. Pt is schedule to see Dr. Delgado at the  location 03/07/24 at 3:25pm per [facility] to send High priority message to care team.     Action Taken: Other: [facility] [[id number]]    Travel Screening: Not Applicable                                                                   
(4) rarely moist

## 2024-03-11 ENCOUNTER — NURSE TRIAGE (OUTPATIENT)
Dept: NURSING | Facility: CLINIC | Age: 34
End: 2024-03-11
Payer: COMMERCIAL

## 2024-03-11 ENCOUNTER — TELEPHONE (OUTPATIENT)
Dept: FAMILY MEDICINE | Facility: CLINIC | Age: 34
End: 2024-03-11
Payer: COMMERCIAL

## 2024-03-11 NOTE — TELEPHONE ENCOUNTER
ED visit 03-06-24, laceration rt thumb.   Mom calling on behalf of pt as pt at work ( o consent to comm on file), requesting wound care instructions.    Advised mom to call care team today to discuss wound/ wound care. States she will have pt call care team over lunch break.   Does have ortho appt scheduled 3/14.  JENNIFER Guzman RN

## 2024-03-12 NOTE — TELEPHONE ENCOUNTER
Patient received stitches at the ED on 3/6/24; liquid bandage/glue was used to keep fingernail intact and covered with gauze over fingernail.    Patient asking when he could wash his thumb.    Reviewed his AVS with him. Since it's past 24 hrs, he does not have to keep it dry. Advised to wash w/ soap and water, dry then apply abx ointment as recommended.    Patient verbalized understanding.    Reason for Disposition    Care of sutured (or stapled) wound,  questions about    Protocols used: Suture or Staple [id number]

## 2024-03-12 NOTE — TELEPHONE ENCOUNTER
Wound care instructions per ED on 3/6/24:    Keep the wound clean and dry for the next 24 hours. Apply Vaseline or  topical antibiotic ointment to the tip of the thumb several times per day  until the stitches followed. Protect your thumb with the splint. Followup  in orthopedic surgery clinic for a recheck. The stitches will dissolve  on their own over the next 7 to 10 days. Take the antibiotics as  prescribed.

## 2024-03-13 NOTE — TELEPHONE ENCOUNTER
Patient returned call, relayed wound care instructions per nurse triage encounter dated 3/11/24, encounter closed.     Julie Behrendt RN

## 2024-03-13 NOTE — PROGRESS NOTES
SUBJECTIVE:   Rudi Alejo is a 33 year old male who is seen as a ED referral for evaluation of a right thumb injury that occurred on 3/6/24. Thumb smashed by a forklift tyne    Present symptoms: tenderness     Treatments tried to this point:  nailbed sutured, dressed.      Review of Systems:  Constitutional:  NEGATIVE for fever, chills, change in weight  Integumentary/Skin:  NEGATIVE for worrisome rashes, moles or lesions  Eyes:  NEGATIVE for vision changes or irritation  ENT/Mouth:  NEGATIVE for ear, mouth and throat problems  Resp:  NEGATIVE for significant cough or SOB  Breast:  NEGATIVE for masses, tenderness or discharge  CV:  NEGATIVE for chest pain, palpitations or peripheral edema  GI:  NEGATIVE for nausea, abdominal pain, heartburn, or change in bowel habits  :  Negative   Musculoskeletal:  See HPI above  Neuro:  NEGATIVE for weakness, dizziness or paresthesias  Endocrine:  NEGATIVE for temperature intolerance, skin/hair changes  Heme/allergy/immune:  NEGATIVE for bleeding problems  Psychiatric:  NEGATIVE for changes in mood or affect    Past Medical History: No past medical history on file.  Past Surgical History: No past surgical history on file.  Family History:   Family History   Problem Relation Age of Onset    Diabetes Maternal Grandmother     Neurologic Disorder Maternal Grandmother     Arthritis Maternal Grandfather     C.A.D. Maternal Grandfather     Cancer - colorectal Paternal Grandmother     Cancer - colorectal Paternal Grandfather      Social History:   Social History     Tobacco Use    Smoking status: Never    Smokeless tobacco: Never   Substance Use Topics    Alcohol use: No       OBJECTIVE:  Physical Exam:  /78 (BP Location: Left arm, Patient Position: Sitting, Cuff Size: Adult Regular)   Pulse 78   SpO2 98%   General Appearance: healthy, alert and no distress   Skin: no suspicious lesions or rashes  Neuro: Normal strength and tone, mentation intact and speech  normal  Vascular: good pulses, and cappillary refill   Lymph: no lymphadenopathy   Psych:  mentation appears normal and affect normal/bright  Resp: no increased work of breathing     Right Hand Exam:  Inspection: nailbed injury. There appears to be a remnant nail in the nail fold  Sensation intact of pulp  Tender: over the distal segment of the thumb        EXAM: XR FINGER RIGHT G/E 2 VIEWS  LOCATION: [facility]  DATE: 3/6/2024  Soft tissue wound of the distal thumb. Subtle cortical lucency involving the tuft of the distal phalanx which may reflect a nondisplaced fracture. No adiopaque foreign body. Normal joint spaces and alignment.        ASSESSMENT:   Encounter Diagnoses   Name Primary?    Crush injury to thumb, right, initial encounter Yes   No or maybe a small non displaced distal phalanx fracture     PLAN:   Dressings changed today  Discussed dressing changes  Follow up in 2 weeks for recheck  Discussed potential nail problems in the future, but I am optimistic his nail should be ok.  Work note-- modified x 4 more weeks     PJ Delgado MD  Dept. Orthopedic Surgery  [facility]

## 2024-03-14 ENCOUNTER — OFFICE VISIT (OUTPATIENT)
Dept: ORTHOPEDICS | Facility: CLINIC | Age: 34
End: 2024-03-14
Payer: OTHER MISCELLANEOUS

## 2024-03-14 VITALS — DIASTOLIC BLOOD PRESSURE: 78 MMHG | SYSTOLIC BLOOD PRESSURE: 127 MMHG | OXYGEN SATURATION: 98 % | HEART RATE: 78 BPM

## 2024-03-14 DIAGNOSIS — S67.01XA CRUSH INJURY TO THUMB, RIGHT, INITIAL ENCOUNTER: Primary | ICD-10-CM

## 2024-03-14 PROCEDURE — 99203 OFFICE O/P NEW LOW 30 MIN: CPT | Performed by: ORTHOPAEDIC SURGERY

## 2024-03-14 ASSESSMENT — PAIN SCALES - GENERAL: PAINLEVEL: MODERATE PAIN (5)

## 2024-03-14 NOTE — LETTER
March 14, 2024      Rudi Alejo  [address] [facility] [address]  [place] [address]    Work Injury: Yes  Date of Injury: 3/6/24    MMI: No  Employer: [employer] , [place]        To Whom It May Concern:    Rudi Alejo was seen in our clinic regarding a right thumb injury requiring restrictions to activities.     Restrictions: Limited use of right hand for any repetitive lifting,carrying, pushing or pulling.    Length of restrictions: 4 weeks.       Sincerely,    Electronically signed as below;    Casey Delgado

## 2024-03-14 NOTE — LETTER
3/14/2024         RE: Rudi Alejo  [address]  [place] [address]        Dear Colleague,    Thank you for referring your patient, Rudi Alejo, to the [facility]. Please see a copy of my visit note below.    SUBJECTIVE:   Rudi Alejo is a 33 year old male who is seen as a ED referral for evaluation of a right thumb injury that occurred on 3/6/24. Thumb smashed by a forklift tyne    Present symptoms: tenderness     Treatments tried to this point:  nailbed sutured, dressed.      Review of Systems:  Constitutional:  NEGATIVE for fever, chills, change in weight  Integumentary/Skin:  NEGATIVE for worrisome rashes, moles or lesions  Eyes:  NEGATIVE for vision changes or irritation  ENT/Mouth:  NEGATIVE for ear, mouth and throat problems  Resp:  NEGATIVE for significant cough or SOB  Breast:  NEGATIVE for masses, tenderness or discharge  CV:  NEGATIVE for chest pain, palpitations or peripheral edema  GI:  NEGATIVE for nausea, abdominal pain, heartburn, or change in bowel habits  :  Negative   Musculoskeletal:  See HPI above  Neuro:  NEGATIVE for weakness, dizziness or paresthesias  Endocrine:  NEGATIVE for temperature intolerance, skin/hair changes  Heme/allergy/immune:  NEGATIVE for bleeding problems  Psychiatric:  NEGATIVE for changes in mood or affect    Past Medical History: No past medical history on file.  Past Surgical History: No past surgical history on file.  Family History:   Family History   Problem Relation Age of Onset     Diabetes Maternal Grandmother      Neurologic Disorder Maternal Grandmother      Arthritis Maternal Grandfather      C.A.D. Maternal Grandfather      Cancer - colorectal Paternal Grandmother      Cancer - colorectal Paternal Grandfather      Social History:   Social History     Tobacco Use     Smoking status: Never     Smokeless tobacco: Never   Substance Use Topics     Alcohol use: No       OBJECTIVE:  Physical Exam:  /78  (BP Location: Left arm, Patient Position: Sitting, Cuff Size: Adult Regular)   Pulse 78   SpO2 98%   General Appearance: healthy, alert and no distress   Skin: no suspicious lesions or rashes  Neuro: Normal strength and tone, mentation intact and speech normal  Vascular: good pulses, and cappillary refill   Lymph: no lymphadenopathy   Psych:  mentation appears normal and affect normal/bright  Resp: no increased work of breathing     Right Hand Exam:  Inspection: nailbed injury. There appears to be a remnant nail in the nail fold  Sensation intact of pulp  Tender: over the distal segment of the thumb        EXAM: XR FINGER RIGHT G/E 2 VIEWS  LOCATION: [facility]  DATE: 3/6/2024  Soft tissue wound of the distal thumb. Subtle cortical lucency involving the tuft of the distal phalanx which may reflect a nondisplaced fracture. No adiopaque foreign body. Normal joint spaces and alignment.        ASSESSMENT:   Encounter Diagnoses   Name Primary?     Crush injury to thumb, right, initial encounter Yes   No or maybe a small non displaced distal phalanx fracture     PLAN:   Dressings changed today  Discussed dressing changes  Follow up in 2 weeks for recheck  Discussed potential nail problems in the future, but I am optimistic his nail should be ok.  Work note-- modified x 4 more weeks     PJ Delgado MD  Dept. Orthopedic Surgery  [facility]       Again, thank you for allowing me to participate in the care of your patient.        Sincerely,        Casey Delgado MD

## 2024-03-26 NOTE — PROGRESS NOTES
"SUBJECTIVE:   Rudi Alejo is a 33 year old male who is seen in follow-up for his work related 3/6/24 crushing injury of his right thumb.    Open fracture of tuft of distal phalanx of right thumb. Date of injury : 03-06-24 was seen in ER at the [facility] location on 03/06/24.     Present symptoms: pain thumb tip.     Review of Systems:  Constitutional:  NEGATIVE for fever, chills, change in weight  Integumentary/Skin:  NEGATIVE for worrisome rashes, moles or lesions  Eyes:  NEGATIVE for vision changes or irritation  ENT/Mouth:  NEGATIVE for ear, mouth and throat problems  Resp:  NEGATIVE for significant cough or SOB  Breast:  NEGATIVE for masses, tenderness or discharge  CV:  NEGATIVE for chest pain, palpitations or peripheral edema  GI:  NEGATIVE for nausea, abdominal pain, heartburn, or change in bowel habits  :  Negative   Musculoskeletal:  See HPI above  Neuro:  NEGATIVE for weakness, dizziness or paresthesias  Endocrine:  NEGATIVE for temperature intolerance, skin/hair changes  Heme/allergy/immune:  NEGATIVE for bleeding problems  Psychiatric:  NEGATIVE for changes in mood or affect    Past Medical History:   Past Medical History:   Diagnosis Date    Open avulsion fracture of phalanx of right thumb, sequela     work comp injury     Past Surgical History: No past surgical history on file.  Family History:   Family History   Problem Relation Age of Onset    Diabetes Maternal Grandmother     Neurologic Disorder Maternal Grandmother     Arthritis Maternal Grandfather     C.A.D. Maternal Grandfather     Cancer - colorectal Paternal Grandmother     Cancer - colorectal Paternal Grandfather      Social History:   Social History     Tobacco Use    Smoking status: Never    Smokeless tobacco: Never   Substance Use Topics    Alcohol use: No       OBJECTIVE:  Physical Exam:  BP (!) 148/87 (BP Location: Right arm, Patient Position: Sitting, Cuff Size: Adult Regular)   Pulse 61   Ht 1.689 m (5' 6.5\")   Wt 88.9 kg (196 lb) "   SpO2 98%   BMI 31.16 kg/m    General Appearance: healthy, alert and no distress   Skin: no suspicious lesions or rashes  Neuro: Normal strength and tone, mentation intact and speech normal  Vascular: good pulses, and cappillary refill   Lymph: no lymphadenopathy   Psych:  mentation appears normal and affect normal/bright  Resp: no increased work of breathing     Right Hand Exam:  Inspection: thumb nail obscured by  adaptic  on thumb,    Nailfold appears intact.  Tender: very tender over thumb tip, particularly ulnarly.  Numbness on part of pulp     ASSESSMENT:   Encounter Diagnoses   Name Primary?    Crush injury to thumb, right, initial encounter Yes       PLAN:   Thumb soaked, and we were not able to get the adaptic off  Soak daily to try to tease the adaptic off.     WORK  NOTE:  written for modifications x 3 more weeks.  Hand therapy ordered for desensitization, maybe could help with wound soaks.     Return to clinic: 3 weeks.     PJ Delgado MD  Dept. Orthopedic Surgery  [facility]

## 2024-03-28 ENCOUNTER — OFFICE VISIT (OUTPATIENT)
Dept: ORTHOPEDICS | Facility: CLINIC | Age: 34
End: 2024-03-28
Payer: OTHER MISCELLANEOUS

## 2024-03-28 VITALS
WEIGHT: 196 LBS | HEART RATE: 61 BPM | OXYGEN SATURATION: 98 % | DIASTOLIC BLOOD PRESSURE: 87 MMHG | BODY MASS INDEX: 30.76 KG/M2 | HEIGHT: 67 IN | SYSTOLIC BLOOD PRESSURE: 148 MMHG

## 2024-03-28 DIAGNOSIS — S67.01XA CRUSH INJURY TO THUMB, RIGHT, INITIAL ENCOUNTER: Primary | ICD-10-CM

## 2024-03-28 PROCEDURE — 99213 OFFICE O/P EST LOW 20 MIN: CPT | Performed by: ORTHOPAEDIC SURGERY

## 2024-03-28 ASSESSMENT — PAIN SCALES - GENERAL: PAINLEVEL: MILD PAIN (2)

## 2024-03-28 NOTE — LETTER
3/28/2024         RE: Rudi Alejo  [address]  [facility] [address]        Dear Colleague,    Thank you for referring your patient, Rudi Alejo, to the [facility]. Please see a copy of my visit note below.    SUBJECTIVE:   Rudi Alejo is a 33 year old male who is seen in follow-up for his work related 3/6/24 crushing injury of his right thumb.    Open fracture of tuft of distal phalanx of right thumb. Date of injury : 03-06-24 was seen in ER at the [facility] on 03/06/24.     Present symptoms: pain thumb tip.     Review of Systems:  Constitutional:  NEGATIVE for fever, chills, change in weight  Integumentary/Skin:  NEGATIVE for worrisome rashes, moles or lesions  Eyes:  NEGATIVE for vision changes or irritation  ENT/Mouth:  NEGATIVE for ear, mouth and throat problems  Resp:  NEGATIVE for significant cough or SOB  Breast:  NEGATIVE for masses, tenderness or discharge  CV:  NEGATIVE for chest pain, palpitations or peripheral edema  GI:  NEGATIVE for nausea, abdominal pain, heartburn, or change in bowel habits  :  Negative   Musculoskeletal:  See HPI above  Neuro:  NEGATIVE for weakness, dizziness or paresthesias  Endocrine:  NEGATIVE for temperature intolerance, skin/hair changes  Heme/allergy/immune:  NEGATIVE for bleeding problems  Psychiatric:  NEGATIVE for changes in mood or affect    Past Medical History:   Past Medical History:   Diagnosis Date     Open avulsion fracture of phalanx of right thumb, sequela     work comp injury     Past Surgical History: No past surgical history on file.  Family History:   Family History   Problem Relation Age of Onset     Diabetes Maternal Grandmother      Neurologic Disorder Maternal Grandmother      Arthritis Maternal Grandfather      C.A.D. Maternal Grandfather      Cancer - colorectal Paternal Grandmother      Cancer - colorectal Paternal Grandfather      Social History:   Social History     Tobacco Use     Smoking  "status: Never     Smokeless tobacco: Never   Substance Use Topics     Alcohol use: No       OBJECTIVE:  Physical Exam:  BP (!) 148/87 (BP Location: Right arm, Patient Position: Sitting, Cuff Size: Adult Regular)   Pulse 61   Ht 1.689 m (5' 6.5\")   Wt 88.9 kg (196 lb)   SpO2 98%   BMI 31.16 kg/m    General Appearance: healthy, alert and no distress   Skin: no suspicious lesions or rashes  Neuro: Normal strength and tone, mentation intact and speech normal  Vascular: good pulses, and cappillary refill   Lymph: no lymphadenopathy   Psych:  mentation appears normal and affect normal/bright  Resp: no increased work of breathing     Right Hand Exam:  Inspection: thumb nail obscured by  adaptic  on thumb,    Nailfold appears intact.  Tender: very tender over thumb tip, particularly ulnarly.  Numbness on part of pulp     ASSESSMENT:   Encounter Diagnoses   Name Primary?     Crush injury to thumb, right, initial encounter Yes       PLAN:   Thumb soaked, and we were not able to get the adaptic off  Soak daily to try to tease the adaptic off.     WORK  NOTE:  written for modifications x 3 more weeks.  Hand therapy ordered for desensitization, maybe could help with wound soaks.     Return to clinic: 3 weeks.     PJ Delgado MD  Dept. Orthopedic Surgery  [facility]       Again, thank you for allowing me to participate in the care of your patient.        Sincerely,        Casey Delgado MD  "

## 2024-03-28 NOTE — LETTER
March 28, 2024      Rudi Alejo  [address] [place] [id number]        To Whom It May Concern:       Rudi Alejo was seen in our clinic regarding a right thumb injury requiring restrictions to activities.      Restrictions: Limited use of right hand for any repetitive lifting,carrying, pushing or pulling.     Length of restrictions: 3 weeks.    Next clinic visit: 3 weeks.       Sincerely,    Electronically signed as gregory Delgado

## 2024-04-05 ENCOUNTER — OFFICE VISIT (OUTPATIENT)
Dept: FAMILY MEDICINE | Facility: CLINIC | Age: 34
End: 2024-04-05
Attending: FAMILY MEDICINE
Payer: COMMERCIAL

## 2024-04-05 VITALS
BODY MASS INDEX: 31.39 KG/M2 | HEART RATE: 88 BPM | HEIGHT: 67 IN | WEIGHT: 200 LBS | SYSTOLIC BLOOD PRESSURE: 118 MMHG | DIASTOLIC BLOOD PRESSURE: 80 MMHG | OXYGEN SATURATION: 98 % | RESPIRATION RATE: 20 BRPM | TEMPERATURE: 97.9 F

## 2024-04-05 DIAGNOSIS — F41.0 PANIC ATTACK: Primary | ICD-10-CM

## 2024-04-05 PROCEDURE — 99212 OFFICE O/P EST SF 10 MIN: CPT | Performed by: FAMILY MEDICINE

## 2024-04-05 ASSESSMENT — ANXIETY QUESTIONNAIRES
7. FEELING AFRAID AS IF SOMETHING AWFUL MIGHT HAPPEN: NOT AT ALL
2. NOT BEING ABLE TO STOP OR CONTROL WORRYING: SEVERAL DAYS
IF YOU CHECKED OFF ANY PROBLEMS ON THIS QUESTIONNAIRE, HOW DIFFICULT HAVE THESE PROBLEMS MADE IT FOR YOU TO DO YOUR WORK, TAKE CARE OF THINGS AT HOME, OR GET ALONG WITH OTHER PEOPLE: SOMEWHAT DIFFICULT
GAD7 TOTAL SCORE: 3
5. BEING SO RESTLESS THAT IT IS HARD TO SIT STILL: NOT AT ALL
3. WORRYING TOO MUCH ABOUT DIFFERENT THINGS: SEVERAL DAYS
6. BECOMING EASILY ANNOYED OR IRRITABLE: SEVERAL DAYS
GAD7 TOTAL SCORE: 3
1. FEELING NERVOUS, ANXIOUS, OR ON EDGE: NOT AT ALL

## 2024-04-05 ASSESSMENT — PATIENT HEALTH QUESTIONNAIRE - PHQ9
5. POOR APPETITE OR OVEREATING: NOT AT ALL
SUM OF ALL RESPONSES TO PHQ QUESTIONS 1-9: 0

## 2024-04-05 ASSESSMENT — PAIN SCALES - GENERAL: PAINLEVEL: NO PAIN (0)

## 2024-04-05 ASSESSMENT — ENCOUNTER SYMPTOMS: NERVOUS/ANXIOUS: 1

## 2024-04-05 NOTE — PROGRESS NOTES
"  Assessment & Plan     Panic attack  Has improved, not currently taking any medications.   - PRIMARY CARE FOLLOW-UP SCHEDULING            BMI  Estimated body mass index is 31.32 kg/m  as calculated from the following:    Height as of this encounter: 1.702 m (5' 7\").    Weight as of this encounter: 90.7 kg (200 lb).             Rowdy Grijalva is a 33 year old, presenting for the following health issues:  Anxiety        4/5/2024     2:48 PM   Additional Questions   Roomed by Humera     History of Present Illness       Reason for visit:  Follow up    He eats 0-1 servings of fruits and vegetables daily.He consumes 4 sweetened beverage(s) daily.He exercises with enough effort to increase his heart rate 60 or more minutes per day.  He exercises with enough effort to increase his heart rate 5 days per week.   He is taking medications regularly.         Depression and Anxiety   How are you doing with your depression since your last visit? Improved   How are you doing with your anxiety since your last visit?  Improved   Are you having other symptoms that might be associated with depression or anxiety? No  Have you had a significant life event? No   Do you have any concerns with your use of alcohol or other drugs? No    Social History     Tobacco Use    Smoking status: Never    Smokeless tobacco: Never   Vaping Use    Vaping Use: Never used   Substance Use Topics    Alcohol use: No    Drug use: No         10/10/2018    11:20 AM 3/5/2024     7:46 AM 4/5/2024     2:51 PM   PHQ   PHQ-9 Total Score 2 10 0   Q9: Thoughts of better off dead/self-harm past 2 weeks Not at all Not at all Not at all         11/11/2019     3:51 PM 3/5/2024     7:50 AM 4/5/2024     2:51 PM   MITRA-7 SCORE   Total Score  7 (mild anxiety)    Total Score 3 7 3         4/5/2024     2:51 PM   Last PHQ-9   1.  Little interest or pleasure in doing things 0   2.  Feeling down, depressed, or hopeless 0   3.  Trouble falling or staying asleep, or sleeping too " "much 0   4.  Feeling tired or having little energy 0   5.  Poor appetite or overeating 0   6.  Feeling bad about yourself 0   7.  Trouble concentrating 0   8.  Moving slowly or restless 0   Q9: Thoughts of better off dead/self-harm past 2 weeks 0   PHQ-9 Total Score 0   Difficulty at work, home, or with people Not difficult at all         4/5/2024     2:51 PM   MITRA-7    1. Feeling nervous, anxious, or on edge 0   2. Not being able to stop or control worrying 1   3. Worrying too much about different things 1   4. Trouble relaxing 0   5. Being so restless that it is hard to sit still 0   6. Becoming easily annoyed or irritable 1   7. Feeling afraid, as if something awful might happen 0   MITRA-7 Total Score 3   If you checked any problems, how difficult have they made it for you to do your work, take care of things at home, or get along with other people? Somewhat difficult       Suicide Assessment Five-step Evaluation and Treatment (SAFE-T)                Objective    /80 (BP Location: Right arm)   Pulse 88   Temp 97.9  F (36.6  C) (Tympanic)   Resp 20   Ht 1.702 m (5' 7\")   Wt 90.7 kg (200 lb)   SpO2 98%   BMI 31.32 kg/m    Body mass index is 31.32 kg/m .  Physical Exam  Vitals reviewed.   Cardiovascular:      Rate and Rhythm: Normal rate.   Pulmonary:      Effort: Pulmonary effort is normal.   Psychiatric:         Mood and Affect: Mood normal.         Behavior: Behavior normal.         Thought Content: Thought content normal.         Judgment: Judgment normal.                    Signed Electronically by: Ruthann Landaverde MD    "

## 2024-04-08 ENCOUNTER — THERAPY VISIT (OUTPATIENT)
Dept: OCCUPATIONAL THERAPY | Facility: CLINIC | Age: 34
End: 2024-04-08
Attending: ORTHOPAEDIC SURGERY
Payer: OTHER MISCELLANEOUS

## 2024-04-08 DIAGNOSIS — S67.01XA CRUSH INJURY TO THUMB, RIGHT, INITIAL ENCOUNTER: ICD-10-CM

## 2024-04-08 PROCEDURE — 97165 OT EVAL LOW COMPLEX 30 MIN: CPT | Mod: GO | Performed by: OCCUPATIONAL THERAPIST

## 2024-04-08 PROCEDURE — 97110 THERAPEUTIC EXERCISES: CPT | Mod: GO | Performed by: OCCUPATIONAL THERAPIST

## 2024-04-08 PROCEDURE — 97140 MANUAL THERAPY 1/> REGIONS: CPT | Mod: GO | Performed by: OCCUPATIONAL THERAPIST

## 2024-04-08 NOTE — PROGRESS NOTES
OCCUPATIONAL THERAPY EVALUATION  Type of Visit: Evaluation    See electronic medical record for Abuse and Falls Screening details.    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 He complains of stiffness and hypersensitivity at tip and MD thought therapy may help  Date of onset: 03/06/24    Relevant medical history:   none  Dates & types of surgery:  none    Prior diagnostic imaging/testing results: X-ray     Prior therapy history for the same diagnosis, illness or injury: No      Prior Level of Function  Transfers: Independent  Ambulation: Independent  ADL: Independent  IADL:  independent    Living Environment  Social support: Alone   Type of home: Apartment/condo   Stairs to enter the home: No       Ramp: No   Stairs inside the home: No       Help at home: None  Equipment owned:       Employment: Yes   Hobbies/Interests: Re.nooblee golf, mountain biking, woodworking    Patient goals for therapy:  Patient would like to be able to get up out of bathtub and wash hair. And to return to work    Pain assessment: See objective evaluation for additional pain details     Objective   ADDITIONAL HISTORY:  Left hand dominant  Patient reports symptoms of pain, stiffness/loss of motion, weakness/loss of strength, edema, numbness, and tingling   Transportation: drives  Currently working in normal job with restrictions    Functional Outcome Measure:   Upper Extremity Functional Index Score:  SCORE:   Column Totals: /80: 27   (A lower score indicates greater disability.)       PAIN:  Pain Level at Rest: 1/10  Pain Level with Use: 8/10  Pain Location: thumb  Pain Quality: stinging and sharp if bumped  Pain Frequency: intermittent  Pain is Worst: daytime  Pain is Exacerbated By: bumping it mostly  Pain is Relieved By: rest  Pain Progression: Improved          EDEMA: none apparent     SCAR/WOUND: black - dried blood looking distal  and volar tip of left thumb    SENSATION: hypersensitive as well as some numbness and tingling reported , hypersensitive radial boarder and over top of thumb distal phalanx      ROM:   Thumb ROM  Left AROM Right AROM    MP Joint     IP Joint   0/42   Kapandji Opposition Scale (0-10/10)  7           STRENGTH:     Measured in pounds 4/8/2024 4/8/2024    Left Right   Average 102 98     Lateral Pinch  Measured in pounds 4/8/2024 4/8/2024    Left Right   Average 22 13     3 Point Pinch  Measured in pounds 4/8/2024 4/8/2024    Left Right   Average 20 6                  Assessment & Plan   CLINICAL IMPRESSIONS  Medical Diagnosis: Right thumb crush injury    Treatment Diagnosis: decreased ADL's IADL's    Impression/Assessment: Pt is a 33 year old male presenting to Occupational Therapy due to right thumb tip crush injury.  The following significant findings have been identified: Impaired ROM, Impaired sensation, Impaired strength, and Pain.  These identified deficits interfere with their ability to perform self care tasks, work tasks, and recreational activities as compared to previous level of function.     Clinical Decision Making (Complexity):  Assessment of Occupational Performance: 3-5 Performance Deficits  Occupational Performance Limitations: dressing, meal preparation and cleanup, shopping, work, and leisure activities  Clinical Decision Making (Complexity): Low complexity    PLAN OF CARE  Treatment Interventions:  Modalities: Fluidotherapy, Infrared, Ultrasound  Interventions: Self-Care/Home Management, Therapeutic Exercise, Manual Therapy, Orthotic Fitting/Training    Long Term Goals   OT Goal 1  Goal Identifier: home program  Goal Description: Patient to be independent with home program, not needing more than 15% cuing  Rationale: In order to maximize safety and independence with ADL/IADLs  Target Date: 05/20/24  OT Goal 2  Goal Identifier: hypersensitivity  Goal Description: decreased hypersensitivity by  90%  Rationale: In order to maximize safety and independence with ADL/IADLs  Target Date: 06/03/24  OT Goal 3  Goal Identifier: pinch  Goal Description: increase pinch strength  by 10 # lat and coker  Rationale: In order to maximize safety and independence with ADL/IADLs  Target Date: 06/17/24      Frequency of Treatment: 1x/week  Duration of Treatment: 10 weejs     Recommended Referrals to Other Professionals:   Education Assessment: Learner/Method: Patient;Listening;Reading;Demonstration;Pictures/Video;No Barriers to Learning  Education Comments: PTRX put on phone     Risks and benefits of evaluation/treatment have been explained.   Patient/Family/caregiver agrees with Plan of Care.     Evaluation Time:    OT [place], Low Complexity Minutes (76686): 20       Signing Clinician: BRIGIDA Casper/L CHT  Occupational Therapist, Certified Hand Therapist

## 2024-04-14 ENCOUNTER — HEALTH MAINTENANCE LETTER (OUTPATIENT)
Age: 34
End: 2024-04-14

## 2024-04-15 ENCOUNTER — THERAPY VISIT (OUTPATIENT)
Dept: OCCUPATIONAL THERAPY | Facility: CLINIC | Age: 34
End: 2024-04-15
Attending: ORTHOPAEDIC SURGERY
Payer: OTHER MISCELLANEOUS

## 2024-04-15 DIAGNOSIS — S67.01XA CRUSH INJURY TO THUMB, RIGHT, INITIAL ENCOUNTER: Primary | ICD-10-CM

## 2024-04-15 PROCEDURE — 97026 INFRARED THERAPY: CPT | Mod: GO | Performed by: OCCUPATIONAL THERAPIST

## 2024-04-15 PROCEDURE — 97140 MANUAL THERAPY 1/> REGIONS: CPT | Mod: GO | Performed by: OCCUPATIONAL THERAPIST

## 2024-04-15 PROCEDURE — 97110 THERAPEUTIC EXERCISES: CPT | Mod: GO | Performed by: OCCUPATIONAL THERAPIST

## 2024-04-16 NOTE — PROGRESS NOTES
Chief Complaint   Patient presents with    Right Thumb - Pain     W/C. Open tuft fracture. DOI 3/6/24, 6 wk s/p. Danny' patient. Doing well. Left hand dominant. No pain, more numbness.        INJURY:  Open fracture of tuft of distal phalanx of right thumb  DATE of INJURY: 3/6/2024.        SUBJECTIVE:   Rudi Alejo is a 33 year old male, LHD, who is seen in follow-up for his work related 3/6/24 crushing injury of his right thumb at work. Has been followed by Dr. Michelle Delgado. Presents today for followup with us as Dr Delgado is out of office. Returns today doing well. No pain . Does have some numbness and tingling in the thumb tip. Some soreness with pressure on the thumb. Has been covering the tip daily with a bandage.    He's been working light duties, but some things he still can do like lift heavy boards, sheets of plywood, or operate a certain machine, the one actually injured thumb with, that requires a push button with right thumb to operate.    He was seen in ER at the [facility] on 03/06/24.     Works at [employer].    Review of Systems:   Denies numbness, tingling, parasthesias.   Denies headaches.   Denies fevers, chills, night sweats   Denies chest pain.   Denies shortness of breath.   Denies any skin problems, abrasions, rashes, irritation.      Past Medical History:   Past Medical History:   Diagnosis Date    Open avulsion fracture of phalanx of right thumb, sequela     work comp injury     Past Surgical History: No past surgical history on file.  Family History:   Family History   Problem Relation Age of Onset    Diabetes Maternal Grandmother     Neurologic Disorder Maternal Grandmother     Arthritis Maternal Grandfather     C.A.D. Maternal Grandfather     Cancer - colorectal Paternal Grandmother     Cancer - colorectal Paternal Grandfather      Social History:   Social History     Tobacco Use    Smoking status: Never    Smokeless tobacco: Never   Substance Use Topics    Alcohol use: No  "      OBJECTIVE:  Physical Exam:  Ht 1.702 m (5' 7\")   Wt 90.7 kg (200 lb)   BMI 31.32 kg/m    General Appearance: healthy, alert and no distress   Skin: no suspicious lesions or rashes  Neuro: Normal strength and tone, mentation intact and speech normal  Vascular: good pulses, and cappillary refill   Psych:  mentation appears normal and affect normal/bright  Resp: no increased work of breathing     Right Hand Exam:  Inspection: thumb nail indented.  There is dried blood/eschar over the tip.    Nailfold appears intact.  Tender: minimal over tip.  Numbness on part of the tip.    Thumb range of motion within normal limits.       3 view right thumb 4/18/2024 reviewed,  There is some residual soft tissue swelling involving the thumb, with  some lucency associated with the nail bed. No evidence of an acute  fracture with attention to the distal phalanx. No jose cortical  destructive change to suggest osteomyelitis.      ASSESSMENT:  34yo LHD male with crush injury to right thumb, posisble open tuft fracture.      PLAN:   Images reviewed, unclear if fracture or not. Either way, will heal on its own.  Recommend warm soapy water soaks to help rid devitalized tissues, eschar, etc  Range of motion  Scar massage, desensitization  Workability with restrictions  Return to clinic 4 weeks, sooner if needed. No xrays needed.    PJ Delgado MD  Dept. Orthopedic Surgery  [facility]   "

## 2024-04-18 ENCOUNTER — ANCILLARY PROCEDURE (OUTPATIENT)
Dept: GENERAL RADIOLOGY | Facility: CLINIC | Age: 34
End: 2024-04-18
Attending: ORTHOPAEDIC SURGERY
Payer: COMMERCIAL

## 2024-04-18 ENCOUNTER — OFFICE VISIT (OUTPATIENT)
Dept: ORTHOPEDICS | Facility: CLINIC | Age: 34
End: 2024-04-18
Payer: OTHER MISCELLANEOUS

## 2024-04-18 VITALS — BODY MASS INDEX: 31.39 KG/M2 | HEIGHT: 67 IN | WEIGHT: 200 LBS

## 2024-04-18 DIAGNOSIS — S67.01XD CRUSHING INJURY OF RIGHT THUMB, SUBSEQUENT ENCOUNTER: Primary | ICD-10-CM

## 2024-04-18 DIAGNOSIS — S67.01XD CRUSHING INJURY OF RIGHT THUMB, SUBSEQUENT ENCOUNTER: ICD-10-CM

## 2024-04-18 PROCEDURE — 73140 X-RAY EXAM OF FINGER(S): CPT | Mod: TC | Performed by: RADIOLOGY

## 2024-04-18 PROCEDURE — 99213 OFFICE O/P EST LOW 20 MIN: CPT | Performed by: ORTHOPAEDIC SURGERY

## 2024-04-18 ASSESSMENT — PAIN SCALES - GENERAL: PAINLEVEL: NO PAIN (0)

## 2024-04-18 NOTE — LETTER
April 18, 2024      Rudi Alejo  [address]   [facility] [address]        To Whom It May Concern:    Rudi Alejo was seen in our clinic today. He may return to work with the following restrictions: light use of right hand. Less than 10lbs lifting with right upper extremity. No operating machines that require pressing/pressure applied with the right thumb. Ok to drive.   Return to clinic in 4 weeks    Sincerely,        Richard Celaya PA-C, CAQ-OS  Dept. of Orthopedic Surgery  [facility]

## 2024-04-18 NOTE — LETTER
4/18/2024         RE: Rudi Alejo  [address]  [facility] [address]        Dear Colleague,    Thank you for referring your patient, Rudi Alejo, to the [facility] [place]. Please see a copy of my visit note below.    Chief Complaint   Patient presents with     Right Thumb - Pain     W/C. Open tuft fracture. DOI 3/6/24, 6 wk s/p. Danny' patient. Doing well. Left hand dominant. No pain, more numbness.        INJURY:  Open fracture of tuft of distal phalanx of right thumb  DATE of INJURY: 3/6/2024.        SUBJECTIVE:   Rudi Alejo is a 33 year old male, LHD, who is seen in follow-up for his work related 3/6/24 crushing injury of his right thumb at work. Has been followed by Dr. Michelle Delgado. Presents today for followup with us as Dr Delgado is out of office. Returns today doing well. No pain . Does have some numbness and tingling in the thumb tip. Some soreness with pressure on the thumb. Has been covering the tip daily with a bandage.    He's been working light duties, but some things he still can do like lift heavy boards, sheets of plywood, or operate a certain machine, the one actually injured thumb with, that requires a push button with right thumb to operate.    He was seen in ER at the [facility] on 03/06/24.     Works at [employer].    Review of Systems:   Denies numbness, tingling, parasthesias.   Denies headaches.   Denies fevers, chills, night sweats   Denies chest pain.   Denies shortness of breath.   Denies any skin problems, abrasions, rashes, irritation.      Past Medical History:   Past Medical History:   Diagnosis Date     Open avulsion fracture of phalanx of right thumb, sequela     work comp injury     Past Surgical History: No past surgical history on file.  Family History:   Family History   Problem Relation Age of Onset     Diabetes Maternal Grandmother      Neurologic Disorder Maternal Grandmother      Arthritis Maternal Grandfather   "    PASCUAL. Maternal Grandfather      Cancer - colorectal Paternal Grandmother      Cancer - colorectal Paternal Grandfather      Social History:   Social History     Tobacco Use     Smoking status: Never     Smokeless tobacco: Never   Substance Use Topics     Alcohol use: No       OBJECTIVE:  Physical Exam:  Ht 1.702 m (5' 7\")   Wt 90.7 kg (200 lb)   BMI 31.32 kg/m    General Appearance: healthy, alert and no distress   Skin: no suspicious lesions or rashes  Neuro: Normal strength and tone, mentation intact and speech normal  Vascular: good pulses, and cappillary refill   Psych:  mentation appears normal and affect normal/bright  Resp: no increased work of breathing     Right Hand Exam:  Inspection: thumb nail indented.  There is dried blood/eschar over the tip.    Nailfold appears intact.  Tender: minimal over tip.  Numbness on part of the tip.    Thumb range of motion within normal limits.       3 view right thumb 4/18/2024 reviewed,  There is some residual soft tissue swelling involving the thumb, with  some lucency associated with the nail bed. No evidence of an acute  fracture with attention to the distal phalanx. No jose cortical  destructive change to suggest osteomyelitis.      ASSESSMENT:  34yo LHD male with crush injury to right thumb, posisble open tuft fracture.      PLAN:   Images reviewed, unclear if fracture or not. Either way, will heal on its own.  Recommend warm soapy water soaks to help rid devitalized tissues, eschar, etc  Range of motion  Scar massage, desensitization  Workability with restrictions  Return to clinic 4 weeks, sooner if needed. No xrays needed.    PJ Delgado MD  Dept. Orthopedic Surgery  [facility]       Again, thank you for allowing me to participate in the care of your patient.        Sincerely,        Rick Sullivan MD  "

## 2024-04-23 ENCOUNTER — THERAPY VISIT (OUTPATIENT)
Dept: OCCUPATIONAL THERAPY | Facility: CLINIC | Age: 34
End: 2024-04-23
Attending: ORTHOPAEDIC SURGERY
Payer: OTHER MISCELLANEOUS

## 2024-04-23 DIAGNOSIS — S67.01XA CRUSH INJURY TO THUMB, RIGHT, INITIAL ENCOUNTER: Primary | ICD-10-CM

## 2024-04-23 PROCEDURE — 97530 THERAPEUTIC ACTIVITIES: CPT | Mod: GO | Performed by: OCCUPATIONAL THERAPIST

## 2024-04-23 PROCEDURE — 97026 INFRARED THERAPY: CPT | Mod: GO | Performed by: OCCUPATIONAL THERAPIST

## 2024-04-23 PROCEDURE — 97140 MANUAL THERAPY 1/> REGIONS: CPT | Mod: GO | Performed by: OCCUPATIONAL THERAPIST

## 2024-04-30 ENCOUNTER — THERAPY VISIT (OUTPATIENT)
Dept: OCCUPATIONAL THERAPY | Facility: CLINIC | Age: 34
End: 2024-04-30
Attending: ORTHOPAEDIC SURGERY
Payer: OTHER MISCELLANEOUS

## 2024-04-30 DIAGNOSIS — S67.01XA CRUSH INJURY TO THUMB, RIGHT, INITIAL ENCOUNTER: Primary | ICD-10-CM

## 2024-04-30 PROCEDURE — 97535 SELF CARE MNGMENT TRAINING: CPT | Mod: GO | Performed by: OCCUPATIONAL THERAPIST

## 2024-04-30 PROCEDURE — 97026 INFRARED THERAPY: CPT | Mod: GO | Performed by: OCCUPATIONAL THERAPIST

## 2024-04-30 PROCEDURE — 97110 THERAPEUTIC EXERCISES: CPT | Mod: GO | Performed by: OCCUPATIONAL THERAPIST

## 2024-04-30 PROCEDURE — 97140 MANUAL THERAPY 1/> REGIONS: CPT | Mod: GO | Performed by: OCCUPATIONAL THERAPIST

## 2024-05-07 ENCOUNTER — THERAPY VISIT (OUTPATIENT)
Dept: OCCUPATIONAL THERAPY | Facility: CLINIC | Age: 34
End: 2024-05-07
Attending: ORTHOPAEDIC SURGERY
Payer: OTHER MISCELLANEOUS

## 2024-05-07 DIAGNOSIS — S67.01XA CRUSH INJURY TO THUMB, RIGHT, INITIAL ENCOUNTER: Primary | ICD-10-CM

## 2024-05-07 PROCEDURE — 97140 MANUAL THERAPY 1/> REGIONS: CPT | Mod: GO | Performed by: OCCUPATIONAL THERAPIST

## 2024-05-07 PROCEDURE — 97026 INFRARED THERAPY: CPT | Mod: GO | Performed by: OCCUPATIONAL THERAPIST

## 2024-05-07 PROCEDURE — 97110 THERAPEUTIC EXERCISES: CPT | Mod: GO | Performed by: OCCUPATIONAL THERAPIST

## 2024-05-13 NOTE — PROGRESS NOTES
Chief Complaint   Patient presents with    Right Thumb - RECHECK     W/C. Open tuft fracture. DOI 3/6/24, 10 wk s/p. Patient notes his thumb has healed really well. He still has some numbness at the tip of the thumb. If he bumps it he will have a little pain and if he pushes where there is a crease in his nail.        INJURY:  Open fracture of tuft of distal phalanx of right thumb  DATE of INJURY: 3/6/2024.        SUBJECTIVE:   Rudi Alejo is a 33 year old male, LHD, who is seen in follow-up for his work related 3/6/24 crushing injury of his right thumb at work. Has been followed by Dr. Michelle Delgado. Presents today for followup with us again as Dr Delagdo is still out of office. Returns today doing well. No pain . Does have some residual numbness and tingling in the thumb tip. Some soreness with pressure on the thumb. Nail is still indented, slowly growing out.     He's been working light duties, but states he's back doing most things now.    He was seen in ER at [facility] initially on 03/06/24.     Works at [employer].    Review of Systems:   Denies numbness, tingling, parasthesias.   Denies headaches.   Denies fevers, chills, night sweats   Denies chest pain.   Denies shortness of breath.   Denies any skin problems, abrasions, rashes, irritation.      Past Medical History:   Past Medical History:   Diagnosis Date    Open avulsion fracture of phalanx of right thumb, sequela     work comp injury     Past Surgical History: No past surgical history on file.  Family History:   Family History   Problem Relation Age of Onset    Diabetes Maternal Grandmother     Neurologic Disorder Maternal Grandmother     Arthritis Maternal Grandfather     C.A.D. Maternal Grandfather     Cancer - colorectal Paternal Grandmother     Cancer - colorectal Paternal Grandfather      Social History:   Social History     Tobacco Use    Smoking status: Never    Smokeless tobacco: Never   Substance Use Topics    Alcohol use: No  "      OBJECTIVE:  Physical Exam:  /88   Pulse 51   Ht 1.702 m (5' 7\")   Wt 89.7 kg (197 lb 11.2 oz)   BMI 30.96 kg/m    General Appearance: healthy, alert and no distress   Skin: no suspicious lesions or rashes  Neuro: Normal strength and tone, mentation intact and speech normal  Vascular: good pulses, and cappillary refill   Psych:  mentation appears normal and affect normal/bright  Resp: no increased work of breathing     Right Hand Exam:  Inspection: thumb nail indented. Newer nail proximal.  Wound over the tip is fully healed, there is a scar.    Tender: minimal over tip.  Numbness on part of the tip.    Thumb range of motion within normal limits.     No new images today.  3 view right thumb 4/18/2024 -- there is some residual soft tissue swelling involving the thumb, with some lucency associated with the nail bed. No evidence of an acute fracture with attention to the distal phalanx. No jose cortical destructive change to suggest osteomyelitis.      ASSESSMENT:  34yo LHD male with crush injury to right thumb       PLAN:   Glad to hear overall improving  Expect continued improvements with time  Nail plate should continue to grow out, might always have some sort of deformity/divot.  Likely will have some sensitivity in the tip for quite some time, maybe longterm.  Range of motion  Scar massage, desensitization  Workability without restrictions  Return to clinic as needed.      * All questions were addressed and answered prior to discharge from clinic today. The patient acknowledges an understanding of and agreement with the plan set forth during today's visit. Patient was advised to call our office or MyChart us if any further questions arise upon leaving our office today.      Rick Sullivan M.D., M.S.  Dept. of Orthopaedic Surgery  [facility]    "

## 2024-05-16 ENCOUNTER — OFFICE VISIT (OUTPATIENT)
Dept: ORTHOPEDICS | Facility: CLINIC | Age: 34
End: 2024-05-16
Payer: OTHER MISCELLANEOUS

## 2024-05-16 VITALS
BODY MASS INDEX: 31.03 KG/M2 | DIASTOLIC BLOOD PRESSURE: 88 MMHG | HEART RATE: 51 BPM | WEIGHT: 197.7 LBS | HEIGHT: 67 IN | SYSTOLIC BLOOD PRESSURE: 124 MMHG

## 2024-05-16 DIAGNOSIS — S67.01XD CRUSH INJURY TO THUMB, RIGHT, SUBSEQUENT ENCOUNTER: Primary | ICD-10-CM

## 2024-05-16 PROCEDURE — 99213 OFFICE O/P EST LOW 20 MIN: CPT | Performed by: ORTHOPAEDIC SURGERY

## 2024-05-16 ASSESSMENT — PAIN SCALES - GENERAL: PAINLEVEL: NO PAIN (0)

## 2024-05-16 NOTE — LETTER
5/16/2024         RE: Rudi Alejo  [address]  [facility] [address]        Dear Colleague,    Thank you for referring your patient, Rudi Alejo, to the [facility]. Please see a copy of my visit note below.    Chief Complaint   Patient presents with     Right Thumb - RECHECK     W/C. Open tuft fracture. DOI 3/6/24, 10 wk s/p. Patient notes his thumb has healed really well. He still has some numbness at the tip of the thumb. If he bumps it he will have a little pain and if he pushes where there is a crease in his nail.        INJURY:  Open fracture of tuft of distal phalanx of right thumb  DATE of INJURY: 3/6/2024.        SUBJECTIVE:   Rudi Alejo is a 33 year old male, LHD, who is seen in follow-up for his work related 3/6/24 crushing injury of his right thumb at work. Has been followed by Dr. Michelle Delgado. Presents today for followup with us again as Dr Delgado is still out of office. Returns today doing well. No pain . Does have some residual numbness and tingling in the thumb tip. Some soreness with pressure on the thumb. Nail is still indented, slowly growing out.     He's been working light duties, but states he's back doing most things now.    He was seen in ER at [facility] initially on 03/06/24.     Works at [employer].    Review of Systems:   Denies numbness, tingling, parasthesias.   Denies headaches.   Denies fevers, chills, night sweats   Denies chest pain.   Denies shortness of breath.   Denies any skin problems, abrasions, rashes, irritation.      Past Medical History:   Past Medical History:   Diagnosis Date     Open avulsion fracture of phalanx of right thumb, sequela     work comp injury     Past Surgical History: No past surgical history on file.  Family History:   Family History   Problem Relation Age of Onset     Diabetes Maternal Grandmother      Neurologic Disorder Maternal Grandmother      Arthritis Maternal  "Grandfather      MARJJefferyKAYEDELANEY. Maternal Grandfather      Cancer - colorectal Paternal Grandmother      Cancer - colorectal Paternal Grandfather      Social History:   Social History     Tobacco Use     Smoking status: Never     Smokeless tobacco: Never   Substance Use Topics     Alcohol use: No       OBJECTIVE:  Physical Exam:  /88   Pulse 51   Ht 1.702 m (5' 7\")   Wt 89.7 kg (197 lb 11.2 oz)   BMI 30.96 kg/m    General Appearance: healthy, alert and no distress   Skin: no suspicious lesions or rashes  Neuro: Normal strength and tone, mentation intact and speech normal  Vascular: good pulses, and cappillary refill   Psych:  mentation appears normal and affect normal/bright  Resp: no increased work of breathing     Right Hand Exam:  Inspection: thumb nail indented. Newer nail proximal.  Wound over the tip is fully healed, there is a scar.    Tender: minimal over tip.  Numbness on part of the tip.    Thumb range of motion within normal limits.     No new images today.  3 view right thumb 4/18/2024 -- there is some residual soft tissue swelling involving the thumb, with some lucency associated with the nail bed. No evidence of an acute fracture with attention to the distal phalanx. No jose cortical destructive change to suggest osteomyelitis.      ASSESSMENT:  32yo LHD male with crush injury to right thumb       PLAN:   Glad to hear overall improving  Expect continued improvements with time  Nail plate should continue to grow out, might always have some sort of deformity/divot.  Likely will have some sensitivity in the tip for quite some time, maybe longterm.  Range of motion  Scar massage, desensitization  Workability without restrictions  Return to clinic as needed.      * All questions were addressed and answered prior to discharge from clinic today. The patient acknowledges an understanding of and agreement with the plan set forth during today's visit. Patient was advised to call our office or MyChart us if any " further questions arise upon leaving our office today.      Rick Sullivan M.D., M.S.  Dept. of Orthopaedic Surgery  [facility]      Again, thank you for allowing me to participate in the care of your patient.        Sincerely,        Rick Sullivan MD

## 2024-05-16 NOTE — LETTER
May 16, 2024      Rudi Alejo  [id number] [address]        To whom it may concern:     Rudi Alejo is under my care for a right thumb open tuft fracture.     Work related injury: Yes       Date of injury: 3/6/24         Mr. Alejo may return to work as of 5/17/24 with no restrictions. Please contact my office with any questions.     Next appointment: As needed        Electronically Signed (as below)  Dr. Rick Sullivan M.D.

## 2024-05-22 ENCOUNTER — THERAPY VISIT (OUTPATIENT)
Dept: OCCUPATIONAL THERAPY | Facility: CLINIC | Age: 34
End: 2024-05-22
Attending: ORTHOPAEDIC SURGERY
Payer: OTHER MISCELLANEOUS

## 2024-05-22 DIAGNOSIS — S67.01XA CRUSH INJURY TO THUMB, RIGHT, INITIAL ENCOUNTER: Primary | ICD-10-CM

## 2024-05-22 PROCEDURE — 97026 INFRARED THERAPY: CPT | Mod: GO | Performed by: OCCUPATIONAL THERAPIST

## 2024-05-22 PROCEDURE — 97140 MANUAL THERAPY 1/> REGIONS: CPT | Mod: GO | Performed by: OCCUPATIONAL THERAPIST

## 2024-05-22 PROCEDURE — 97022 WHIRLPOOL THERAPY: CPT | Mod: GO | Performed by: OCCUPATIONAL THERAPIST

## 2024-06-14 ENCOUNTER — APPOINTMENT (OUTPATIENT)
Dept: GENERAL RADIOLOGY | Facility: CLINIC | Age: 34
End: 2024-06-14
Attending: PHYSICIAN ASSISTANT
Payer: COMMERCIAL

## 2024-06-14 ENCOUNTER — HOSPITAL ENCOUNTER (EMERGENCY)
Facility: CLINIC | Age: 34
Discharge: HOME | End: 2024-06-14
Attending: PHYSICIAN ASSISTANT | Admitting: PHYSICIAN ASSISTANT
Payer: COMMERCIAL

## 2024-06-14 VITALS
TEMPERATURE: 98.4 F | SYSTOLIC BLOOD PRESSURE: 134 MMHG | DIASTOLIC BLOOD PRESSURE: 77 MMHG | OXYGEN SATURATION: 98 % | RESPIRATION RATE: 24 BRPM | HEART RATE: 78 BPM

## 2024-06-14 DIAGNOSIS — J40 BRONCHITIS: ICD-10-CM

## 2024-06-14 PROCEDURE — 99213 OFFICE O/P EST LOW 20 MIN: CPT | Performed by: PHYSICIAN ASSISTANT

## 2024-06-14 PROCEDURE — 71046 X-RAY EXAM CHEST 2 VIEWS: CPT

## 2024-06-14 PROCEDURE — G0463 HOSPITAL OUTPT CLINIC VISIT: HCPCS | Mod: 25 | Performed by: PHYSICIAN ASSISTANT

## 2024-06-14 RX ORDER — ALBUTEROL SULFATE 90 UG/1
2 AEROSOL, METERED RESPIRATORY (INHALATION) EVERY 6 HOURS PRN
Qty: 18 G | Refills: 0 | Status: SHIPPED | OUTPATIENT
Start: 2024-06-14 | End: 2024-06-27

## 2024-06-14 ASSESSMENT — COLUMBIA-SUICIDE SEVERITY RATING SCALE - C-SSRS
2. HAVE YOU ACTUALLY HAD ANY THOUGHTS OF KILLING YOURSELF IN THE PAST MONTH?: NO
6. HAVE YOU EVER DONE ANYTHING, STARTED TO DO ANYTHING, OR PREPARED TO DO ANYTHING TO END YOUR LIFE?: NO
1. IN THE PAST MONTH, HAVE YOU WISHED YOU WERE DEAD OR WISHED YOU COULD GO TO SLEEP AND NOT WAKE UP?: NO

## 2024-06-14 ASSESSMENT — ACTIVITIES OF DAILY LIVING (ADL): ADLS_ACUITY_SCORE: 35

## 2024-06-14 NOTE — ED PROVIDER NOTES
"  History     Chief Complaint   Patient presents with    Shortness of Breath    Cough     Patient states he inhaled concrete dust at work a week ago , he has since been experiencing shortness of breath intermittently with a cough      HPI  Rudi Alejo is a 33 year old male with medical history significant for anxiety, history of spina bifida who presents to urgent care with concern over cough, shortness of breath for the last week which developed fairly quickly after inhaling concrete dust at work last week.  He also notes a intermittent tight sensation in his chest.  Cough, shortness of breath are exacerbated by activity/exertion.  He denies any associated fever, chills, myalgias, nasal congestion, sore throat, wheezing, nausea, vomiting, diarrhea or abdominal pain.  No prior history of asthma, COPD or other breathing related disorders.  He is a non-smoker.      Allergies:  Allergies   Allergen Reactions    Suprax [Cefixime] Hives       Problem List:    Patient Active Problem List    Diagnosis Date Noted    Crush injury to thumb, right, initial encounter 04/08/2024     Priority: Medium    Hx of spina bifida 11/06/2013     Priority: Medium     11/5/2014 MRI of entire spine given his history.  Lumbar with interesting findings.  Per the neurosurgeon who saw him in clinic for follow up:      \"His most remarkable MRI was his lumbar spine. 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 He has some malformed posterior elements actually extending into the thoracic region as visible on the thoracic MRI. Essentially, he has a large thecal sac with tiny, little strands extending from his spinal cord. These, however, appear not to be causing any adhesions. In fact, there is good CSF surrounding the spinal cord at all levels. His nerves appear to float freely within the thecal sac. There is no margination along the sac. However, he does have very little soft tissue extending from his dura to the surface " "of the skin, on the order of 1-2 cm. Because of this, I told him that he should exercise caution in terms of any damage caused by an impact blow along the lower portion of his back\"    No specific follow up recommended per neurosurgery.        CARDIOVASCULAR SCREENING; LDL GOAL LESS THAN 160 02/20/2012     Priority: Medium    Anxiety 07/26/2010     Priority: Medium     Better on celexa       Patellofemoral pain syndrome 04/05/2010     Priority: Medium      Past Medical History:    Past Medical History:   Diagnosis Date    Open avulsion fracture of phalanx of right thumb, sequela      Past Surgical History:    No past surgical history on file.    Family History:    Family History   Problem Relation Age of Onset    Diabetes Maternal Grandmother     Neurologic Disorder Maternal Grandmother     Arthritis Maternal Grandfather     C.A.D. Maternal Grandfather     Cancer - colorectal Paternal Grandmother     Cancer - colorectal Paternal Grandfather      Social History:  Marital Status:  Single [1]  Social History     Tobacco Use    Smoking status: Never    Smokeless tobacco: Never   Vaping Use    Vaping status: Never Used   Substance Use Topics    Alcohol use: No    Drug use: No      Medications:    hydrOXYzine hudson (VISTARIL) 25 MG capsule      Review of Systems  CONSTITUTIONAL:NEGATIVE for fever, chills, change in weight  INTEGUMENTARY/SKIN: NEGATIVE for worrisome rashes, moles or lesions  EYES: NEGATIVE for vision changes or irritation  ENT/MOUTH: NEGATIVE for sore throat, nasal congestion, ear pain   RESP:POSITIVE for cough, shortness of breath, chest tightness NEGATIVE for wheezing   GI: NEGATIVE for vomiting, diarrhea, abdominal pain   Physical Exam   BP: 134/77  Pulse: 78  Temp: 98.4  F (36.9  C)  Resp: 24  SpO2: 98 %  Physical Exam  GENERAL APPEARANCE: healthy, alert and no distress  EYES: EOMI,  PERRL, conjunctiva clear  HENT: ear canals and TM's normal.  Nose and mouth without ulcers, erythema or lesions  NECK: " supple, nontender, no lymphadenopathy  RESP: lungs clear to auscultation - no rales, rhonchi or wheezes  CV: regular rates and rhythm, normal S1 S2, no murmur noted  SKIN: no suspicious lesions or rashes  ED Course        Procedures       Critical Care time:  none            Results for orders placed or performed during the hospital encounter of 06/14/24 (from the past 24 hour(s))   Chest XR,  PA & LAT    Narrative    CHEST TWO VIEWS 6/14/2024 4:04 PM     HISTORY: Cough, shortness of breath after inhaling concrete dust last  week.    COMPARISON: None.       Impression    IMPRESSION: There are no acute infiltrates. The cardiac silhouette is  not enlarged. Pulmonary vasculature is unremarkable.    ALEXANDR CHEW MD         SYSTEM ID:  [id number]       Medications - No data to display    Assessments & Plan (with Medical Decision Making)     I have reviewed the nursing notes.    I have reviewed the findings, diagnosis, plan and need for follow up with the patient.       Discharge Medication List as of 6/14/2024  4:13 PM        START taking these medications    Details   albuterol (PROAIR HFA/PROVENTIL HFA/VENTOLIN HFA) 108 (90 Base) MCG/ACT inhaler Inhale 2 puffs into the lungs every 6 hours as needed, Disp-18 g, R-0, E-PrescribePharmacy may dispense brand covered by insurance (Proair, or proventil or ventolin or generic albuterol inhaler)           Final diagnoses:   Bronchitis     33-year-old male presents urgent care with concern for shortness of breath after inhaling concrete dust at work last week.  Chest x-ray which is negative for acute infiltrate.  Symptoms consistent with bronchitis.  Differentials include anxiety.  I do not suspect PE, MI/ACS, unstable angina, CHF.  He was discharged home stable with prescription for albuterol inhaler for symptomatic relief.  Follow-up if no improvement within the next 5 to 7 days.  Worrisome reasons to return to the ER/UC sooner discussed.    Disclaimer: This note consists of  symbols derived from keyboarding, dictation, and/or voice recognition software. As a result, there may be errors in the script that have gone undetected.  Please consider this when interpreting information found in the chart.      6/14/2024   [facility] EMERGENCY DEPT       Socorro Lozano PA-C  06/15/24 3006

## 2024-06-27 ENCOUNTER — OFFICE VISIT (OUTPATIENT)
Dept: FAMILY MEDICINE | Facility: CLINIC | Age: 34
End: 2024-06-27
Attending: PHYSICIAN ASSISTANT
Payer: OTHER MISCELLANEOUS

## 2024-06-27 VITALS
OXYGEN SATURATION: 99 % | HEIGHT: 66 IN | WEIGHT: 190 LBS | SYSTOLIC BLOOD PRESSURE: 126 MMHG | HEART RATE: 77 BPM | DIASTOLIC BLOOD PRESSURE: 85 MMHG | RESPIRATION RATE: 14 BRPM | BODY MASS INDEX: 30.53 KG/M2 | TEMPERATURE: 97.3 F

## 2024-06-27 DIAGNOSIS — J40 BRONCHITIS: ICD-10-CM

## 2024-06-27 PROBLEM — S67.01XA CRUSH INJURY TO THUMB, RIGHT, INITIAL ENCOUNTER: Status: RESOLVED | Noted: 2024-04-08 | Resolved: 2024-06-27

## 2024-06-27 PROCEDURE — 99213 OFFICE O/P EST LOW 20 MIN: CPT | Performed by: FAMILY MEDICINE

## 2024-06-27 RX ORDER — ALBUTEROL SULFATE 90 UG/1
2 AEROSOL, METERED RESPIRATORY (INHALATION) EVERY 4 HOURS PRN
Qty: 18 G | Refills: 1 | Status: SHIPPED | OUTPATIENT
Start: 2024-06-27 | End: 2024-07-10

## 2024-06-27 RX ORDER — PREDNISONE 20 MG/1
TABLET ORAL
Qty: 13 TABLET | Refills: 0 | Status: SHIPPED | OUTPATIENT
Start: 2024-06-27

## 2024-06-27 ASSESSMENT — PAIN SCALES - GENERAL: PAINLEVEL: NO PAIN (0)

## 2024-06-27 NOTE — PROGRESS NOTES
"S :Rudi Alejo is a 33 year old male with cough.  Exposed to some concrete dust at work a few weeks ago.  Not really going away.  Albuterol helps.    No fever.      O:/85   Pulse 77   Temp 97.3  F (36.3  C) (Tympanic)   Resp 14   Ht 1.676 m (5' 6\")   Wt 86.2 kg (190 lb)   SpO2 99%   BMI 30.67 kg/m    GEN: Alert and oriented, in no acute distress  CV: RRR, no murmur  RESP: lungs clear bilaterally, good effort  [    A; bronchitis, irritant    P: will do a course of prednisone.  Encouraged albuterol prn.      This is likely inflammatory response at this point, just need to settle that back down.   Lung exam is fine.        "

## 2024-07-10 ENCOUNTER — OFFICE VISIT (OUTPATIENT)
Dept: FAMILY MEDICINE | Facility: CLINIC | Age: 34
End: 2024-07-10
Payer: COMMERCIAL

## 2024-07-10 VITALS
HEIGHT: 67 IN | HEART RATE: 88 BPM | RESPIRATION RATE: 24 BRPM | SYSTOLIC BLOOD PRESSURE: 146 MMHG | DIASTOLIC BLOOD PRESSURE: 90 MMHG | BODY MASS INDEX: 30.2 KG/M2 | TEMPERATURE: 97.8 F | OXYGEN SATURATION: 98 % | WEIGHT: 192.4 LBS

## 2024-07-10 DIAGNOSIS — F41.9 ANXIETY: Primary | ICD-10-CM

## 2024-07-10 DIAGNOSIS — J30.89 ALLERGIC REACTION TO INHALED DUST: ICD-10-CM

## 2024-07-10 DIAGNOSIS — J40 BRONCHITIS: ICD-10-CM

## 2024-07-10 PROCEDURE — 99214 OFFICE O/P EST MOD 30 MIN: CPT | Performed by: NURSE PRACTITIONER

## 2024-07-10 RX ORDER — CITALOPRAM HYDROBROMIDE 20 MG/1
TABLET ORAL
Qty: 30 TABLET | Refills: 3 | Status: SHIPPED | OUTPATIENT
Start: 2024-07-10 | End: 2024-08-23

## 2024-07-10 RX ORDER — ALBUTEROL SULFATE 90 UG/1
2 AEROSOL, METERED RESPIRATORY (INHALATION) EVERY 4 HOURS PRN
Qty: 18 G | Refills: 1 | Status: SHIPPED | OUTPATIENT
Start: 2024-07-10

## 2024-07-10 ASSESSMENT — PATIENT HEALTH QUESTIONNAIRE - PHQ9
SUM OF ALL RESPONSES TO PHQ QUESTIONS 1-9: 16
SUM OF ALL RESPONSES TO PHQ QUESTIONS 1-9: 16
10. IF YOU CHECKED OFF ANY PROBLEMS, HOW DIFFICULT HAVE THESE PROBLEMS MADE IT FOR YOU TO DO YOUR WORK, TAKE CARE OF THINGS AT HOME, OR GET ALONG WITH OTHER PEOPLE: SOMEWHAT DIFFICULT

## 2024-07-10 ASSESSMENT — ANXIETY QUESTIONNAIRES
2. NOT BEING ABLE TO STOP OR CONTROL WORRYING: NEARLY EVERY DAY
1. FEELING NERVOUS, ANXIOUS, OR ON EDGE: NEARLY EVERY DAY
7. FEELING AFRAID AS IF SOMETHING AWFUL MIGHT HAPPEN: MORE THAN HALF THE DAYS
3. WORRYING TOO MUCH ABOUT DIFFERENT THINGS: NEARLY EVERY DAY
IF YOU CHECKED OFF ANY PROBLEMS ON THIS QUESTIONNAIRE, HOW DIFFICULT HAVE THESE PROBLEMS MADE IT FOR YOU TO DO YOUR WORK, TAKE CARE OF THINGS AT HOME, OR GET ALONG WITH OTHER PEOPLE: SOMEWHAT DIFFICULT
8. IF YOU CHECKED OFF ANY PROBLEMS, HOW DIFFICULT HAVE THESE MADE IT FOR YOU TO DO YOUR WORK, TAKE CARE OF THINGS AT HOME, OR GET ALONG WITH OTHER PEOPLE?: SOMEWHAT DIFFICULT
5. BEING SO RESTLESS THAT IT IS HARD TO SIT STILL: NEARLY EVERY DAY
6. BECOMING EASILY ANNOYED OR IRRITABLE: MORE THAN HALF THE DAYS
7. FEELING AFRAID AS IF SOMETHING AWFUL MIGHT HAPPEN: MORE THAN HALF THE DAYS
4. TROUBLE RELAXING: NEARLY EVERY DAY
GAD7 TOTAL SCORE: 19
GAD7 TOTAL SCORE: 19

## 2024-07-10 ASSESSMENT — PAIN SCALES - GENERAL: PAINLEVEL: NO PAIN (0)

## 2024-07-10 ASSESSMENT — ENCOUNTER SYMPTOMS: NERVOUS/ANXIOUS: 1

## 2024-07-10 NOTE — PATIENT INSTRUCTIONS
Recommend cool mist humidifier, steam, and exercise.    Continue Albuterol inhaler - may use every 4 hours as needed for cough, wheezing.    Referral to Pulmonologist if not improving over the next 3-4 weeks.    Possible side effects of antidepressants/anxiety meds, including but not limited to GI upset, disrupted sleep, loss of libido, worsening of mood or even possible risk of increased suicidal thoughts.   Often some of these things if not severe will improve after 1-2 weeks on medications but some may not see effects for 3-4 weeks,  if tolerable patients should continue meds and see if there is improvement.  If symptoms are intolerable or for any suicidal thoughts the medication should be stopped immediately and contact the clinic.      These medications should be used for 6-9 months before stopping, to avoid rebound symptoms.   Contact the clinic if having any problems tolerating these medications.  Take the medication daily and do not stop the medication abruptly.    Follow up in one month to discuss improvement and whether or not we need to change meds or increase dose.  Follow up sooner if problems.      Please plan to contact the clinic or 24 hour nurse line at any time if you are having any thoughts of self harm.

## 2024-07-10 NOTE — PROGRESS NOTES
Assessment & Plan     Anxiety  Reviewed [facility]RA - ordered Citalopram with taper up back to 20 mg dose he was previously one.  Monitor for side effects.  No SI reports.  Declined further resources.  FOLLOW UP with PCP     - citalopram (CELEXA) 20 MG tablet  Dispense: 30 tablet; Refill: 3    Allergic reaction to inhaled dust  Discussed treatment options.  Continue Albuterol and complete Prednisone course.    - albuterol (PROAIR HFA/PROVENTIL HFA/VENTOLIN HFA) 108 (90 Base) MCG/ACT inhaler  Dispense: 18 g; Refill: 1  - Adult Pulmonary Medicine  Referral    Bronchitis     - albuterol (PROAIR HFA/PROVENTIL HFA/VENTOLIN HFA) 108 (90 Base) MCG/ACT inhaler  Dispense: 18 g; Refill: 1  - Adult Pulmonary Medicine  Referral    Patient Instructions   Recommend cool mist humidifier, steam, and exercise.    Continue Albuterol inhaler - may use every 4 hours as needed for cough, wheezing.    Referral to Pulmonologist if not improving over the next 3-4 weeks.    Possible side effects of antidepressants/anxiety meds, including but not limited to GI upset, disrupted sleep, loss of libido, worsening of mood or even possible risk of increased suicidal thoughts.   Often some of these things if not severe will improve after 1-2 weeks on medications but some may not see effects for 3-4 weeks,  if tolerable patients should continue meds and see if there is improvement.  If symptoms are intolerable or for any suicidal thoughts the medication should be stopped immediately and contact the clinic.      These medications should be used for 6-9 months before stopping, to avoid rebound symptoms.   Contact the clinic if having any problems tolerating these medications.  Take the medication daily and do not stop the medication abruptly.    Follow up in one month to discuss improvement and whether or not we need to change meds or increase dose.  Follow up sooner if problems.      Please plan to contact the clinic or 24 hour nurse line  "at any time if you are having any thoughts of self harm.             BMI  Estimated body mass index is 30.59 kg/m  as calculated from the following:    Height as of this encounter: 1.689 m (5' 6.5\").    Weight as of this encounter: 87.3 kg (192 lb 6.4 oz).       Depression Screening Follow Up                No data to display                      Follow Up Actions Taken  Referred patient back to mental health provider    Discussed the following ways the patient can remain in a safe environment:  remove alcohol, remove drugs, and be around others    CONSULTATION/REFERRAL to Pulmonology   See Patient Instructions    Rowdy Grijalva is a 33 year old, presenting for the following health issues:  Anxiety and Depression        7/10/2024    11:13 AM   Additional Questions   Roomed by Oz MOHAMUD CMA   Accompanied by self     History of Present Illness       Mental Health Follow-up:  Patient presents to follow-up on Depression & Anxiety.Patient's depression since last visit has been:  Worse  The patient is having other symptoms associated with depression.  Patient's anxiety since last visit has been:  Worse  The patient is having other symptoms associated with anxiety.  Any significant life events: relationship concerns, job concerns, housing concerns and health concerns  Patient is feeling anxious or having panic attacks.  Patient has no concerns about alcohol or drug use.    He eats 0-1 servings of fruits and vegetables daily.He consumes 2 sweetened beverage(s) daily.He exercises with enough effort to increase his heart rate 60 or more minutes per day.  He exercises with enough effort to increase his heart rate 5 days per week.   He is taking medications regularly.     Inhaled concrete dust and came in and given Prednisone.  Has 1 dose left.  Denies current shortness of breath  Reports consistent dry cough.    Has inhaler which is uses prn - helps temporarily    Lives alone in apartment.  Reports trouble sleeping, loss of " "appetite and racing thoughts.    Was taking Citalopram for 10+ years - stopped as he felt better.  Had tried Hydroxyzine - wasn't helpful    Review of Systems  Constitutional, HEENT, cardiovascular, pulmonary, GI, , musculoskeletal, neuro, skin, endocrine and psych systems are negative, except as otherwise noted.      Objective    BP (!) 150/90 (BP Location: Right arm, Patient Position: Sitting, Cuff Size: Adult Regular)   Pulse 88   Temp 97.8  F (36.6  C) (Tympanic)   Resp 24   Ht 1.689 m (5' 6.5\")   Wt 87.3 kg (192 lb 6.4 oz)   SpO2 98%   BMI 30.59 kg/m    Body mass index is 30.59 kg/m .  Physical Exam   GENERAL: alert and no distress  CV: normal rate and rhythm, no murmur.  RESP: rare non productive cough, no dyspnea, lungs clear, no rhonchi with expiratory wheeze anterior and posterior upper airways.  PSYCH: mentation appears normal, affect normal/bright, anxious, judgement and insight intact, and appearance well groomed             Signed Electronically by: Shyanne Perez DNP    "

## 2024-07-11 DIAGNOSIS — J40 BRONCHITIS: Primary | ICD-10-CM

## 2024-07-22 ENCOUNTER — THERAPY VISIT (OUTPATIENT)
Dept: OCCUPATIONAL THERAPY | Facility: CLINIC | Age: 34
End: 2024-07-22
Attending: ORTHOPAEDIC SURGERY
Payer: OTHER MISCELLANEOUS

## 2024-07-22 DIAGNOSIS — S67.01XA CRUSH INJURY TO THUMB, RIGHT, INITIAL ENCOUNTER: Primary | ICD-10-CM

## 2024-07-22 PROCEDURE — 97110 THERAPEUTIC EXERCISES: CPT | Mod: GO | Performed by: OCCUPATIONAL THERAPIST

## 2024-07-22 PROCEDURE — 97026 INFRARED THERAPY: CPT | Mod: GO | Performed by: OCCUPATIONAL THERAPIST

## 2024-07-22 NOTE — PROGRESS NOTES
DISCHARGE  Reason for Discharge: Patient has met all goals.    Equipment Issued: gel tubes, putty    Discharge Plan: Patient to continue home program as needed    Referring Provider:  Casey Delgado    07/22/24 0500   Appointment Info   Treating Provider Geri Nunez OTR/L CHT   Total/Authorized Visits 10   Visits Used 7   Medical Diagnosis Right thumb crush injury   OT Tx Diagnosis decreased ADL's IADL's   Other pertinent information See ortho on 16th   Progress Note/Certification   Onset of Illness/Injury or Date of Surgery 03/06/24   Therapy Frequency 1x/week   Predicted Duration 10 weeks   Goals   OT Goals 1;2;3   OT Goal 1   Goal Identifier home program   Goal Description Patient to be independent with home program, not needing more than 15% cuing   Rationale In order to maximize safety and independence with ADL/IADL's   Target Date 05/20/24   Date Met 04/23/24   OT Goal 2   Goal Identifier hypersensitivity   Goal Description decreased hypersensitivity by 90%   Rationale In order to maximize safety and independence with ADL/IADL's   Goal Progress not hypersensitive but is numb   Target Date 06/03/24   Date Met 07/22/24   OT Goal 3   Goal Identifier pinch   Goal Description increase pinch strength  by 10 # lat and coker   Rationale In order to maximize safety and independence with ADL/IADL's   Target Date 06/17/24   Date Met 04/30/24   Subjective Report   Subjective Report Released now from MD to go back to work. Patient still concerned over numbness at tip of thumb and would like to continue a few more visits.   Objective Measures   Objective Measures Hand Obj Measures;Objective Measure 1   Hand Objective Measures ROM;Strength;Palpation   ROM Thumb ROM   Strength ;Lateral Pinch;3 Point Pinch   Objective Measure 1   Objective Measure UEFI   Details 80/100   Thumb ROM    MP Joint 0/60   IP Joint  0/70   Kapandji Opposition Scale (0-10/10) 9 3/4 ( comparable to uninvolved side)    (measured  in pounds)    Average Strength right- 103, Left 105   Lateral Pinch (measured in pounds)   Lateral Pinch Average Strength right- 24#, left 24#   3 Point Pinch (measured in pounds)   3 Point Pinch Average Strength right- 25#, left 26#   OT Modalities   OT Modalities Infrared;Fluidotherapy   Infrared   Infrared Treatment Minutes (93120)    Infrared Laser Light Therapy <4 Joule/cm2   Location tip of thumb   Patient Response/Progress less sensitive and wound nearly healed now   Treatment Interventions (OT)   Interventions Manual Therapy;Therapeutic Procedure/Exercise;Therapeutic Activity;Self Care/Home Management   Therapeutic Procedure/Exercise   Therapeutic Procedure: strength, endurance, ROM, flexibility minutes (96156)    Ther Proc 1 Review HEP   Skilled Intervention guided instruction needed   Education   Learner/Method Patient;Listening;Reading;Demonstration;Pictures/Video;No Barriers to Learning   Education Comments PTRX put on phone   Plan   Home program Exercise Name: Thumb Active Range of Motion IP Joint Blocking, Sets: 1-3 - Reps: 5-10 reps - Sessions: 2-5, Notes: pain free  Exercise Name: Thumb Active Range of Motion Opposition  Exercise Name: Thumb Active Range of Motion Composite Flexion  Exercise Name: Scar Mobilization - Circular, Notes: desensitize with light massage to distal thumb as tolerated. 5 min 3x/day   Updates to plan of care strengthening   Plan for next session D/C to home program   Total Session Time

## 2024-08-23 ENCOUNTER — OFFICE VISIT (OUTPATIENT)
Dept: PULMONOLOGY | Facility: CLINIC | Age: 34
End: 2024-08-23
Payer: OTHER MISCELLANEOUS

## 2024-08-23 VITALS
SYSTOLIC BLOOD PRESSURE: 118 MMHG | BODY MASS INDEX: 28.88 KG/M2 | DIASTOLIC BLOOD PRESSURE: 82 MMHG | WEIGHT: 184 LBS | HEART RATE: 59 BPM | OXYGEN SATURATION: 99 % | HEIGHT: 67 IN

## 2024-08-23 DIAGNOSIS — J40 BRONCHITIS: ICD-10-CM

## 2024-08-23 DIAGNOSIS — J45.20 MILD INTERMITTENT REACTIVE AIRWAY DISEASE: ICD-10-CM

## 2024-08-23 DIAGNOSIS — R05.3 CHRONIC COUGH: Primary | ICD-10-CM

## 2024-08-23 LAB
DLCOCOR-%PRED-PRE: 105 %
DLCOCOR-PRE: 31.04 ML/MIN/MMHG
DLCOUNC-%PRED-PRE: 106 %
DLCOUNC-PRE: 31.3 ML/MIN/MMHG
DLCOUNC-PRED: 29.3 ML/MIN/MMHG
ERV-%PRED-PRE: 96 %
ERV-PRE: 1.54 L
ERV-PRED: 1.6 L
EXPTIME-PRE: 6.36 SEC
FEF2575-%PRED-POST: 87 %
FEF2575-%PRED-PRE: 79 %
FEF2575-POST: 3.35 L/SEC
FEF2575-PRE: 3.06 L/SEC
FEF2575-PRED: 3.85 L/SEC
FEFMAX-%PRED-PRE: 92 %
FEFMAX-PRE: 8.8 L/SEC
FEFMAX-PRED: 9.49 L/SEC
FEV1-%PRED-PRE: 108 %
FEV1-PRE: 3.99 L
FEV1FEV6-PRE: 74 %
FEV1FEV6-PRED: 83 %
FEV1FVC-PRE: 74 %
FEV1FVC-PRED: 84 %
FEV1SVC-PRE: 73 %
FEV1SVC-PRED: 75 %
FIFMAX-PRE: 6.64 L/SEC
FRCPLETH-%PRED-PRE: 102 %
FRCPLETH-PRE: 3.22 L
FRCPLETH-PRED: 3.16 L
FVC-%PRED-PRE: 122 %
FVC-PRE: 5.38 L
FVC-PRED: 4.39 L
HGB BLD-MCNC: 14.9 G/DL
IC-%PRED-PRE: 117 %
IC-PRE: 3.75 L
IC-PRED: 3.21 L
RVPLETH-%PRED-PRE: 88 %
RVPLETH-PRE: 1.52 L
RVPLETH-PRED: 1.71 L
TLCPLETH-%PRED-PRE: 108 %
TLCPLETH-PRE: 6.97 L
TLCPLETH-PRED: 6.42 L
VA-%PRED-PRE: 116 %
VA-PRE: 6.83 L
VC-%PRED-PRE: 112 %
VC-PRE: 5.46 L
VC-PRED: 4.87 L

## 2024-08-23 PROCEDURE — 99204 OFFICE O/P NEW MOD 45 MIN: CPT | Performed by: NURSE PRACTITIONER

## 2024-08-23 PROCEDURE — 85018 HEMOGLOBIN: CPT | Mod: QW

## 2024-08-23 PROCEDURE — 94060 EVALUATION OF WHEEZING: CPT | Mod: 26 | Performed by: INTERNAL MEDICINE

## 2024-08-23 PROCEDURE — 99207 PR NO BILLABLE SERVICE THIS VISIT: CPT | Performed by: INTERNAL MEDICINE

## 2024-08-23 PROCEDURE — 94729 DIFFUSING CAPACITY: CPT | Mod: 26 | Performed by: INTERNAL MEDICINE

## 2024-08-23 PROCEDURE — 94726 PLETHYSMOGRAPHY LUNG VOLUMES: CPT | Mod: 26 | Performed by: INTERNAL MEDICINE

## 2024-08-23 NOTE — PATIENT INSTRUCTIONS
It was a pleasure to see you in clinic today.   Here is what we discussed:    Your pulmonary function testing was normal.   Continue Albuterol every 4-6 hours as needed for shortness of breath or wheezing.  Call my nurse, Robin ([phone]) with any change or worsening of your breathing.  You can then come in and we can discuss inhalers or CT scan.   Follow-up as needed.     Lis Alejandra CNP  Pulmonary Medicine  [facility]  [phone]

## 2024-08-23 NOTE — PROGRESS NOTES
Pulmonary Clinic Consultation          Assessment/Plan:     33 year old male with a history of spina bifida, anxiety, presenting for evaluation of chronic cough and shortness of breath.      Chronic cough  Shortness of breath  Lifelong non-smoker presents with evaluation for cough and shortness of breath since exposure to concrete dust in June.  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  He did not previously wear a mask.  In June he was performing this task, and developed cough and shortness of breath.  He was given albuterol with temporary benefit, and then a prolonged steroid course which did help somewhat.  His symptoms continue but are slowly improving.  He denies hx of asthma or breathing concerns when younger.  Has some symptoms of seasonal allergies.  CXR was clear on 6/14/24.  Pulmonary function testing today shows normal spirometry, lung volumes, and diffusion capacity.    Plan:  - reviewed PFT results with patient today.  - he may have some underlying reactive airways, that was exacerbated by concrete dust exposure.  I offered an ICS/LABA for his remaining symptoms, he declines today and will continue with Albuterol PRN.  - if symptoms do not continue to improve/resolve, he will present back to our clinic to discuss inhalers and potential chest CT scan.  - also if cough does not resolve, consider work-up for PND or GERD - as he was clearing his throat throughout exam.     Follow-up:  - as needed    Lis Alejandra CNP  Pulmonary Medicine  [facility]  [phone]       CC:     Cough     HPI:     33 year old male with a history of spina bifida, anxiety, presenting for evaluation of chronic cough and shortness of breath.      June, sweeping concrete, inhaled some.  Started coughing couple days later, SOB.  Lasted over a month.  Given inhaler (albuterol), temporary relief.  Hasn't used in a few weeks.  Symptoms continued  x3 weeks after that.  PCP given prednisone, x13 days, helped a little bit, still had cough and SOB.   Feels he is slowly improving.   Strong smells or fumes, feels this in his chest, some SOB, takes shallower breaths when fumes around.   Has not been able to get back to exercise yet.   No issues in past with asthma or breathing.  No lingering illnesses.  No allergy concerns, does feel he has some seasonal allergies, stuffy nose, watery eyes.   No cigarette smoking, no inhalation.  Works in [employer], includes working with concrete, normally does not wear mask, now considering wearing mask.  Hasn't had long term episode like this, been around dust, maybe some brief coughing.      Medical records reviewed for this visit include PCP notes.     ROS:     A 12-system review was obtained and was negative with the exception of the symptoms endorsed in the HPI.       Medical history:       PMH:  Past Medical History:   Diagnosis Date    Open avulsion fracture of phalanx of right thumb, sequela     work comp injury       PSH:  No past surgical history on file.    Allergies:  Allergies   Allergen Reactions    Suprax [Cefixime] Hives       Family Hx:  Family History   Problem Relation Age of Onset    Diabetes Maternal Grandmother     Neurologic Disorder Maternal Grandmother     Arthritis Maternal Grandfather     C.A.D. Maternal Grandfather     Cancer - colorectal Paternal Grandmother     Cancer - colorectal Paternal Grandfather        Social Hx:  Social History     Socioeconomic History    Marital status: Single     Spouse name: Not on file    Number of children: Not on file    Years of education: Not on file    Highest education level: Not on file   Occupational History    Not on file   Tobacco Use    Smoking status: Never     Passive exposure: Past (Dad was a smoker)    Smokeless tobacco: Never   Vaping Use    Vaping status: Never Used   Substance and Sexual Activity    Alcohol use: No    Drug use: No    Sexual  "activity: Never   Other Topics Concern    Parent/sibling w/ CABG, MI or angioplasty before 65F 55M? Not Asked   Social History Narrative    Not on file     Social Determinants of Health     Financial Resource Strain: Not on file   Food Insecurity: Not on file   Transportation Needs: Not on file   Physical Activity: Not on file   Stress: Not on file   Social Connections: Not on file   Interpersonal Safety: Low Risk  (7/10/2024)    Interpersonal Safety     Do you feel physically and emotionally safe where you currently live?: Yes     Within the past 12 months, have you been hit, slapped, kicked or otherwise physically hurt by someone?: No     Within the past 12 months, have you been humiliated or emotionally abused in other ways by your partner or ex-partner?: No   Housing Stability: Not on file       Current Meds:  Current Outpatient Medications   Medication Sig Dispense Refill    albuterol (PROAIR HFA/PROVENTIL HFA/VENTOLIN HFA) 108 (90 Base) MCG/ACT inhaler Inhale 2 puffs into the lungs every 4 hours as needed for cough or wheezing 18 g 1    citalopram (CELEXA) 20 MG tablet Take 0.5 tablets (10 mg) by mouth daily for 14 days, THEN 1 tablet (20 mg) daily for 30 days. 30 tablet 3    predniSONE (DELTASONE) 20 MG tablet 2 tabs daily for 3 days, then one tab daily for 7 days. 13 tablet 0    hydrOXYzine hudson (VISTARIL) 25 MG capsule Take 1-2 capsules (25-50 mg) by mouth 3 times daily as needed for anxiety (insomnia) (Patient not taking: Reported on 7/10/2024) 30 capsule 2          Physical Exam:     /82 (BP Location: Left arm, Patient Position: Sitting, Cuff Size: Adult Regular)   Pulse 59   Ht 1.689 m (5' 6.5\")   Wt 83.5 kg (184 lb)   SpO2 99%   BMI 29.25 kg/m    Gen: adult male, appears in NAD  HEENT: clear conjunctivae, moist mucous membranes.  Clearing throat during exam.  CV: RRR, no M/G/R  Resp: CTAB, no focal crackles or wheezes.  Respirations even and unlabored.  On RA.   Skin: no apparent rashes on " visible skin  Ext: no cyanosis, clubbing or edema  Neuro: alert and answering questions appropriately       Data:     Labs:  reviewed    Imaging studies:  I have personally reviewed all pertinent imaging studies and PFT results unless otherwise noted.    CHEST TWO VIEWS 6/14/2024                                                        IMPRESSION: There are no acute infiltrates. The cardiac silhouette is  not enlarged. Pulmonary vasculature is unremarkable.        Pulmonary Function Testing

## 2024-11-13 ENCOUNTER — OFFICE VISIT (OUTPATIENT)
Dept: FAMILY MEDICINE | Facility: CLINIC | Age: 34
End: 2024-11-13
Payer: COMMERCIAL

## 2024-11-13 VITALS
TEMPERATURE: 97.2 F | HEIGHT: 67 IN | HEART RATE: 70 BPM | BODY MASS INDEX: 31.71 KG/M2 | WEIGHT: 202 LBS | SYSTOLIC BLOOD PRESSURE: 110 MMHG | DIASTOLIC BLOOD PRESSURE: 78 MMHG | RESPIRATION RATE: 16 BRPM | OXYGEN SATURATION: 98 %

## 2024-11-13 DIAGNOSIS — F41.9 ANXIETY: Primary | ICD-10-CM

## 2024-11-13 DIAGNOSIS — F32.A DEPRESSION, UNSPECIFIED DEPRESSION TYPE: ICD-10-CM

## 2024-11-13 PROBLEM — S67.01XA CRUSH INJURY TO THUMB, RIGHT, INITIAL ENCOUNTER: Status: RESOLVED | Noted: 2024-04-08 | Resolved: 2024-11-13

## 2024-11-13 PROCEDURE — 99213 OFFICE O/P EST LOW 20 MIN: CPT | Performed by: FAMILY MEDICINE

## 2024-11-13 RX ORDER — CITALOPRAM HYDROBROMIDE 20 MG/1
20 TABLET ORAL DAILY
Qty: 30 TABLET | Refills: 0 | Status: SHIPPED | OUTPATIENT
Start: 2024-11-13 | End: 2024-12-13

## 2024-11-13 RX ORDER — CITALOPRAM HYDROBROMIDE 20 MG/1
20 TABLET ORAL DAILY
Qty: 90 TABLET | Refills: 2 | Status: SHIPPED | OUTPATIENT
Start: 2024-12-13

## 2024-11-13 ASSESSMENT — ANXIETY QUESTIONNAIRES
2. NOT BEING ABLE TO STOP OR CONTROL WORRYING: SEVERAL DAYS
4. TROUBLE RELAXING: SEVERAL DAYS
IF YOU CHECKED OFF ANY PROBLEMS ON THIS QUESTIONNAIRE, HOW DIFFICULT HAVE THESE PROBLEMS MADE IT FOR YOU TO DO YOUR WORK, TAKE CARE OF THINGS AT HOME, OR GET ALONG WITH OTHER PEOPLE: VERY DIFFICULT
GAD7 TOTAL SCORE: 7
GAD7 TOTAL SCORE: 7
7. FEELING AFRAID AS IF SOMETHING AWFUL MIGHT HAPPEN: SEVERAL DAYS
GAD7 TOTAL SCORE: 7
7. FEELING AFRAID AS IF SOMETHING AWFUL MIGHT HAPPEN: SEVERAL DAYS
5. BEING SO RESTLESS THAT IT IS HARD TO SIT STILL: SEVERAL DAYS
1. FEELING NERVOUS, ANXIOUS, OR ON EDGE: SEVERAL DAYS
6. BECOMING EASILY ANNOYED OR IRRITABLE: SEVERAL DAYS
8. IF YOU CHECKED OFF ANY PROBLEMS, HOW DIFFICULT HAVE THESE MADE IT FOR YOU TO DO YOUR WORK, TAKE CARE OF THINGS AT HOME, OR GET ALONG WITH OTHER PEOPLE?: VERY DIFFICULT
3. WORRYING TOO MUCH ABOUT DIFFERENT THINGS: SEVERAL DAYS

## 2024-11-13 ASSESSMENT — ASTHMA QUESTIONNAIRES
QUESTION_4 LAST FOUR WEEKS HOW OFTEN HAVE YOU USED YOUR RESCUE INHALER OR NEBULIZER MEDICATION (SUCH AS ALBUTEROL): NOT AT ALL
QUESTION_5 LAST FOUR WEEKS HOW WOULD YOU RATE YOUR ASTHMA CONTROL: NOT CONTROLLED AT ALL
QUESTION_1 LAST FOUR WEEKS HOW MUCH OF THE TIME DID YOUR ASTHMA KEEP YOU FROM GETTING AS MUCH DONE AT WORK, SCHOOL OR AT HOME: NONE OF THE TIME
QUESTION_3 LAST FOUR WEEKS HOW OFTEN DID YOUR ASTHMA SYMPTOMS (WHEEZING, COUGHING, SHORTNESS OF BREATH, CHEST TIGHTNESS OR PAIN) WAKE YOU UP AT NIGHT OR EARLIER THAN USUAL IN THE MORNING: NOT AT ALL
ACT_TOTALSCORE: 21
ACT_TOTALSCORE: 21
QUESTION_2 LAST FOUR WEEKS HOW OFTEN HAVE YOU HAD SHORTNESS OF BREATH: NOT AT ALL

## 2024-11-13 ASSESSMENT — PATIENT HEALTH QUESTIONNAIRE - PHQ9: SUM OF ALL RESPONSES TO PHQ QUESTIONS 1-9: 7

## 2024-11-13 ASSESSMENT — PAIN SCALES - GENERAL: PAINLEVEL_OUTOF10: NO PAIN (0)

## 2024-11-13 NOTE — NURSING NOTE
"Chief Complaint   Patient presents with    Anxiety       Initial /78   Pulse 70   Temp 97.2  F (36.2  C) (Tympanic)   Resp 16   Ht 1.689 m (5' 6.5\")   Wt 91.6 kg (202 lb)   SpO2 98%   BMI 32.12 kg/m   Estimated body mass index is 32.12 kg/m  as calculated from the following:    Height as of this encounter: 1.689 m (5' 6.5\").    Weight as of this encounter: 91.6 kg (202 lb).    Patient presents to the clinic using No DME    Is there anyone who you would like to be able to receive your results? No  If yes have patient fill out KEEGAN      "

## 2024-11-13 NOTE — PROGRESS NOTES
"  Assessment & Plan     Anxiety  Some side effects including fatigue and potential blurry vision  No headache  Patient state benefits outweigh the side effects  Stay hydrated, recommend follow up with an eye doctor for an eye exam, has not gone in several years, wears glasses  Start talk therapy, has some life stressors  - citalopram (CELEXA) 20 MG tablet; Take 1 tablet (20 mg) by mouth daily.  - citalopram (CELEXA) 20 MG tablet; Take 1 tablet (20 mg) by mouth daily.  - Adult Mental Health  Referral; Future    Depression, unspecified depression type  As above  - Adult Mental Health  Referral; Future                Subjective   Rudi is a 34 year old, presenting for the following health issues:  Anxiety        11/13/2024     9:47 AM   Additional Questions   Roomed by Aletha GARDNER   Accompanied by self     History of Present Illness       Mental Health Follow-up:  Patient presents to follow-up on Depression & Anxiety.Patient's depression since last visit has been:  Better  The patient is having other symptoms associated with depression.  Patient's anxiety since last visit has been:  Better  The patient is having other symptoms associated with anxiety.  Any significant life events: relationship concerns, job concerns, financial concerns and housing concerns  Patient is feeling anxious or having panic attacks.  Patient has no concerns about alcohol or drug use.    He eats 0-1 servings of fruits and vegetables daily.He consumes 3 sweetened beverage(s) daily.He exercises with enough effort to increase his heart rate 60 or more minutes per day.  He exercises with enough effort to increase his heart rate 5 days per week. He is missing 1 dose(s) of medications per week.  He is not taking prescribed medications regularly due to remembering to take and other.                     Objective    /78   Pulse 70   Temp 97.2  F (36.2  C) (Tympanic)   Resp 16   Ht 1.689 m (5' 6.5\")   Wt 91.6 kg (202 lb)   " SpO2 98%   BMI 32.12 kg/m    Body mass index is 32.12 kg/m .  Physical Exam  Vitals reviewed.   Eyes:      Extraocular Movements: Extraocular movements intact.      Pupils: Pupils are equal, round, and reactive to light.   Cardiovascular:      Rate and Rhythm: Normal rate and regular rhythm.   Pulmonary:      Effort: Pulmonary effort is normal.      Breath sounds: Normal breath sounds.   Neurological:      Mental Status: He is alert.                    Signed Electronically by: Ruthann Landaverde MD

## 2025-01-07 ENCOUNTER — NURSE TRIAGE (OUTPATIENT)
Dept: FAMILY MEDICINE | Facility: CLINIC | Age: 35
End: 2025-01-07
Payer: COMMERCIAL

## 2025-01-07 ENCOUNTER — HOSPITAL ENCOUNTER (EMERGENCY)
Facility: CLINIC | Age: 35
Discharge: HOME | End: 2025-01-07
Payer: COMMERCIAL

## 2025-01-07 VITALS
DIASTOLIC BLOOD PRESSURE: 78 MMHG | RESPIRATION RATE: 18 BRPM | TEMPERATURE: 101.2 F | OXYGEN SATURATION: 97 % | SYSTOLIC BLOOD PRESSURE: 132 MMHG | HEART RATE: 118 BPM

## 2025-01-07 DIAGNOSIS — J10.1 INFLUENZA A: ICD-10-CM

## 2025-01-07 LAB
FLUAV RNA SPEC QL NAA+PROBE: POSITIVE
FLUBV RNA RESP QL NAA+PROBE: NEGATIVE
RSV RNA SPEC NAA+PROBE: NEGATIVE
SARS-COV-2 RNA RESP QL NAA+PROBE: NEGATIVE

## 2025-01-07 PROCEDURE — 87637 SARSCOV2&INF A&B&RSV AMP PRB: CPT | Performed by: EMERGENCY MEDICINE

## 2025-01-07 PROCEDURE — 96372 THER/PROPH/DIAG INJ SC/IM: CPT

## 2025-01-07 PROCEDURE — 250N000011 HC RX IP 250 OP 636

## 2025-01-07 PROCEDURE — 99213 OFFICE O/P EST LOW 20 MIN: CPT

## 2025-01-07 PROCEDURE — G0463 HOSPITAL OUTPT CLINIC VISIT: HCPCS | Mod: 25

## 2025-01-07 RX ORDER — KETOROLAC TROMETHAMINE 30 MG/ML
30 INJECTION, SOLUTION INTRAMUSCULAR; INTRAVENOUS ONCE
Status: COMPLETED | OUTPATIENT
Start: 2025-01-07 | End: 2025-01-07

## 2025-01-07 RX ORDER — BENZONATATE 100 MG/1
100 CAPSULE ORAL 3 TIMES DAILY PRN
Qty: 15 CAPSULE | Refills: 0 | Status: SHIPPED | OUTPATIENT
Start: 2025-01-07 | End: 2025-01-12

## 2025-01-07 RX ORDER — KETOROLAC TROMETHAMINE 30 MG/ML
30 INJECTION, SOLUTION INTRAMUSCULAR; INTRAVENOUS ONCE
Status: DISCONTINUED | OUTPATIENT
Start: 2025-01-07 | End: 2025-01-07

## 2025-01-07 RX ORDER — OSELTAMIVIR PHOSPHATE 75 MG/1
75 CAPSULE ORAL 2 TIMES DAILY
Qty: 10 CAPSULE | Refills: 0 | Status: SHIPPED | OUTPATIENT
Start: 2025-01-07 | End: 2025-01-12

## 2025-01-07 RX ADMIN — KETOROLAC TROMETHAMINE 30 MG: 30 INJECTION, SOLUTION INTRAMUSCULAR; INTRAVENOUS at 19:16

## 2025-01-07 ASSESSMENT — ACTIVITIES OF DAILY LIVING (ADL): ADLS_ACUITY_SCORE: 41

## 2025-01-07 ASSESSMENT — COLUMBIA-SUICIDE SEVERITY RATING SCALE - C-SSRS
6. HAVE YOU EVER DONE ANYTHING, STARTED TO DO ANYTHING, OR PREPARED TO DO ANYTHING TO END YOUR LIFE?: NO
2. HAVE YOU ACTUALLY HAD ANY THOUGHTS OF KILLING YOURSELF IN THE PAST MONTH?: NO
1. IN THE PAST MONTH, HAVE YOU WISHED YOU WERE DEAD OR WISHED YOU COULD GO TO SLEEP AND NOT WAKE UP?: NO

## 2025-01-07 NOTE — Clinical Note
Rudi Alejo was seen and treated in our emergency department on 1/7/2025.    Patient positive for influenza A.  Patient should not return to work until fever free and improvement of symptoms for 24 hours.     Sincerely,     [facility] Emergency Dept

## 2025-01-07 NOTE — TELEPHONE ENCOUNTER
"Nurse Triage SBAR    Is this a 2nd Level Triage? YES, LICENSED PRACTITIONER REVIEW IS REQUIRED    Situation: Patient calling with c/o migraine since last night and chest congestion    Background: Previously had a migraine \"years ago\". Does not have medication or migraines    Assessment: Nonproductive cough. Patient states his chest is congested. Took Dayquil this morning. Fever of 103.5. Has not taken Tylenol. Fever was this morning. Cough/congestion started yesterday afternoon. Patient states people have been sick at work but unsure of illness. Has not tested for covid.     Protocol Recommended Disposition:   Go To Office Now    Recommendation: No available appointments today. Referral sent to ADS     Awaiting ADS response    Does the patient meet one of the following criteria for ADS visit consideration? 16+ years old, with an [facility] PCP     TIP  Providers, please consider if this condition is appropriate for management at one of our Acute and Diagnostic Services sites.     If patient is a good candidate, please use dotphrase <dot>triageresponse and select Refer to ADS to document.  Reason for Disposition   Fever > 103 F (39.4 C)    Additional Information   Negative: Difficult to awaken or acting confused (e.g., disoriented, slurred speech)   Negative: Weakness of the face, arm or leg on one side of the body and new-onset   Negative: Numbness of the face, arm or leg on one side of the body and new-onset   Negative: Loss of speech or garbled speech and new-onset   Negative: Passed out (e.g., fainted, lost consciousness, blacked out and was not responding)   Negative: Sounds like a life-threatening emergency to the triager   Negative: Followed a head injury within last 3 days   Negative: Traumatic Brain Injury (TBI) is suspected   Negative: Sinus pain or congestion is main symptom(s)   Negative: Influenza suspected (i.e., cough, fever, other respiratory symptoms; probable influenza exposure)   Negative: Pregnant   " "Negative: Unable to walk without falling   Negative: Stiff neck (can't touch chin to chest)   Negative: Other family members (or people in same household) with headaches and possibility of carbon monoxide exposure   Negative: SEVERE headache, states 'worst headache' of life   Negative: SEVERE headache, sudden-onset (i.e., reaching maximum intensity within seconds to 1 hour)   Negative: Severe pain in one eye   Negative: Loss of vision or double vision (Exception: Same as previously diagnosed migraines.)   Negative: Patient sounds very sick or weak to the triager    Answer Assessment - Initial Assessment Questions  1. LOCATION: \"Where does it hurt?\"       Started behind eye and now all over  2. ONSET: \"When did the headache start?\" (e.g., minutes, hours, days)       Last night  3. PATTERN: \"Does the pain come and go, or has it been constant since it started?\"      Since Dayquil it comes and go. Previously contstant  4. SEVERITY: \"How bad is the pain?\" and \"What does it keep you from doing?\"  (e.g., Scale 1-10; mild, moderate, or severe)      5  5. RECURRENT SYMPTOM: \"Have you ever had headaches before?\" If Yes, ask: \"When was the last time?\" and \"What happened that time?\"       Yes, last migraine was years ago  6. CAUSE: \"What do you think is causing the headache?\"      Unsure  7. MIGRAINE: \"Have you been diagnosed with migraine headaches?\" If Yes, ask: \"Is this headache similar?\"       Yes, similar  8. HEAD INJURY: \"Has there been any recent injury to the head?\"       No  9. OTHER SYMPTOMS: \"Do you have any other symptoms?\" (e.g., fever, stiff neck, eye pain, sore throat, cold symptoms)      Fever 103.5, chest cough (non-productive)  10. PREGNANCY: \"Is there any chance you are pregnant?\" \"When was your last menstrual period?\"        N/A    Protocols used: Headache-A-[place]    LIANET [facility]    "

## 2025-01-07 NOTE — TELEPHONE ENCOUNTER
Advised patient to take covid test.    ADS provider recommended for patient to go through covid protocol if positive. Otherwise Urgent Care or comfort measures.    Patient informed and voiced understanding.    Wicho GARDNER RN  [facility]

## 2025-01-08 NOTE — DISCHARGE INSTRUCTIONS
Increase oral hydration and rest.  You may take over-the-counter cold and flu medications such as NyQuil or DayQuil.  Use 1 to 2 tablets of Tessalon as needed for cough.

## 2025-01-08 NOTE — ED PROVIDER NOTES
"  History     Chief Complaint   Patient presents with    Cough     HPI  Rudi Alejo is a 34 year old male who presents urgent care with chief complaint of fever and cough.  Patient reports he had nasal congestion intermittently over the last week however he woke up this morning with severe cough, fever and migraine.  Patient did take Tylenol which was not helpful with his symptoms.  Patient denies chest pain, shortness of breath, nausea, vomiting.    Allergies:  Allergies   Allergen Reactions    Suprax [Cefixime] Hives       Problem List:    Patient Active Problem List    Diagnosis Date Noted    Hx of spina bifida 11/06/2013     Priority: Medium     11/5/2014 MRI of entire spine given his history.  Lumbar with interesting findings.  Per the neurosurgeon who saw him in clinic for follow up:      \"His most remarkable MRI was his lumbar spine. 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 He has some malformed posterior elements actually extending into the thoracic region as visible on the thoracic MRI. Essentially, he has a large thecal sac with tiny, little strands extending from his spinal cord. These, however, appear not to be causing any adhesions. In fact, there is good CSF surrounding the spinal cord at all levels. His nerves appear to float freely within the thecal sac. There is no margination along the sac. However, he does have very little soft tissue extending from his dura to the surface of the skin, on the order of 1-2 cm. Because of this, I told him that he should exercise caution in terms of any damage caused by an impact blow along the lower portion of his back\"    No specific follow up recommended per neurosurgery.        Anxiety 07/26/2010     Priority: Medium     Better on celexa       Patellofemoral pain syndrome 04/05/2010     Priority: Medium        Past Medical History:    Past Medical History:   Diagnosis Date    Crush injury to thumb, right, initial encounter " 04/08/2024    Open avulsion fracture of phalanx of right thumb, sequela        Past Surgical History:    No past surgical history on file.    Family History:    Family History   Problem Relation Age of Onset    Diabetes Maternal Grandmother     Neurologic Disorder Maternal Grandmother     Arthritis Maternal Grandfather     C.ADELANEY. Maternal Grandfather     Cancer - colorectal Paternal Grandmother     Cancer - colorectal Paternal Grandfather        Social History:  Marital Status:  Single [1]  Social History     Tobacco Use    Smoking status: Never     Passive exposure: Past (Dad was a smoker)    Smokeless tobacco: Never   Vaping Use    Vaping status: Never Used   Substance Use Topics    Alcohol use: No    Drug use: No        Medications:    benzonatate (TESSALON) 100 MG capsule  oseltamivir (TAMIFLU) 75 MG capsule  citalopram (CELEXA) 20 MG tablet  citalopram (CELEXA) 20 MG tablet          Review of Systems   All other systems reviewed and are negative.      Physical Exam   BP: 132/78  Pulse: 118  Temp: (!) 101.2  F (38.4  C)  Resp: 18  SpO2: 97 %      Physical Exam  Vitals and nursing note reviewed.   Constitutional:       General: He is not in acute distress.     Appearance: Normal appearance. He is ill-appearing. He is not toxic-appearing.   HENT:      Head: Normocephalic.      Right Ear: Tympanic membrane, ear canal and external ear normal.      Left Ear: Tympanic membrane, ear canal and external ear normal.      Nose: Congestion present.      Mouth/Throat:      Mouth: Mucous membranes are moist.      Pharynx: Posterior oropharyngeal erythema present. No oropharyngeal exudate.   Eyes:      Extraocular Movements: Extraocular movements intact.      Pupils: Pupils are equal, round, and reactive to light.   Cardiovascular:      Rate and Rhythm: Normal rate and regular rhythm.      Pulses: Normal pulses.      Heart sounds: Normal heart sounds.   Pulmonary:      Effort: Pulmonary effort is normal. No respiratory  distress.      Breath sounds: No stridor. No wheezing, rhonchi or rales.   Abdominal:      General: Bowel sounds are normal.      Tenderness: There is no guarding.   Lymphadenopathy:      Cervical: Cervical adenopathy present.   Neurological:      Mental Status: He is alert.   Psychiatric:         Mood and Affect: Mood normal.         Behavior: Behavior normal.         ED Course        Procedures        Results for orders placed or performed during the hospital encounter of 01/07/25 (from the past 24 hours)   Influenza A/B, RSV and SARS-CoV2 PCR (COVID-19) Nose    Specimen: Nose; Swab   Result Value Ref Range    Influenza A PCR Positive (A) Negative    Influenza B PCR Negative Negative    RSV PCR Negative Negative    SARS CoV2 PCR Negative Negative    Narrative    Testing was performed using the Xpert Xpress CoV2/Flu/RSV Assay on the Cepheid GeneXpert Instrument. This test should be ordered for the detection of SARS-CoV2, influenza, and RSV viruses in individuals with signs and symptoms of respiratory tract infection. This test is for in vitro diagnostic use under the [place] FDA for laboratories certified under CLIA to perform high or moderate complexity testing. This test has been [place] FDA cleared. A negative result does not rule out the presence of PCR inhibitors in the specimen or target RNA in concentration below the limit of detection for the assay. If only one viral target is positive but coinfection with multiple targets is suspected, the sample should be re-tested with another FDA cleared, approved, or authorized test, if coninfection would change clinical management. This test was validated by the [facility]. These laboratories are certified under the Clinical Laboratory Improvement Amendments of 1988 (CLIA-88) as qualified to perfom high complexity laboratory testing.       Medications   ketorolac (TORADOL) injection 30 mg (30 mg Intramuscular $Given 1/7/25 1916)       Assessments & Plan  (with Medical Decision Making)     I have reviewed the nursing notes.    I have reviewed the findings, diagnosis, plan and need for follow up with the patient.      Medical Decision Making    34 year old male who presents urgent care with chief complaint of fever and cough.  Patient reports he had nasal congestion intermittently over the last week however he woke up this morning with severe cough, fever and migraine.  Patient did take Tylenol which was not helpful with his symptoms.  Patient denies chest pain, shortness of breath, nausea, vomiting.    Exam above.  Patient is ill-appearing in no acute distress.  Lungs CTAB.    Patient tested positive for influenza A today.    Patient given Toradol for migraine today.  I did discuss probable new influenza A given acute onset of symptoms over the last 24 hours.  I recommended treatment with Tamiflu and we discussed adverse effects of this.  Patient was agreeable.  I also prescribed patient Tessalon as needed for cough.  Symptoms should start improving over the next 5 to 7 days.  If symptoms significantly worsen after that time please follow-up.  Increase oral hydration and rest.  Patient should not return to work until fever free for 24 hours.      Prior to making a final disposition on this patient the results of patient's tests and other diagnostic studies were discussed with the patient. All questions were answered. Patient expressed understanding of the plan and was amenable to it.     Disclaimer: This note consists of symbols derived from keyboarding, dictation and/or voice recognition software. As a result, there may be errors in the script that have gone undetected. Please consider this when interpreting information found in this chart.        Discharge Medication List as of 1/7/2025  7:11 PM        START taking these medications    Details   benzonatate (TESSALON) 100 MG capsule Take 1 capsule (100 mg) by mouth 3 times daily as needed for cough., Disp-15  capsule, R-0, E-Prescribe      oseltamivir (TAMIFLU) 75 MG capsule Take 1 capsule (75 mg) by mouth 2 times daily for 5 days., Disp-10 capsule, R-0, E-Prescribe             Final diagnoses:   Influenza A       1/7/2025   [facility] EMERGENCY DEPT       Laurita Mccartney PA-C  01/08/25 1141

## 2025-04-19 ENCOUNTER — HEALTH MAINTENANCE LETTER (OUTPATIENT)
Age: 35
End: 2025-04-19

## 2025-08-27 DIAGNOSIS — F41.9 ANXIETY: ICD-10-CM

## 2025-08-27 RX ORDER — CITALOPRAM HYDROBROMIDE 20 MG/1
20 TABLET ORAL DAILY
Qty: 90 TABLET | Refills: 2 | Status: SHIPPED | OUTPATIENT
Start: 2025-08-27